# Patient Record
Sex: FEMALE | Race: WHITE | NOT HISPANIC OR LATINO | Employment: OTHER | URBAN - METROPOLITAN AREA
[De-identification: names, ages, dates, MRNs, and addresses within clinical notes are randomized per-mention and may not be internally consistent; named-entity substitution may affect disease eponyms.]

---

## 2018-01-02 ENCOUNTER — TELEPHONE (OUTPATIENT)
Dept: CARDIOTHORACIC SURGERY | Facility: CLINIC | Age: 73
End: 2018-01-02

## 2018-01-02 NOTE — TELEPHONE ENCOUNTER
Returned call to pt's daughter to confirm receiving what appeared to be PFT results.  Unfortunately we will need to request the information from Dr. Cruz's office  ----- Message from Rony Cruz sent at 1/2/2018 12:18 PM CST -----  Contact: Lisa- Daughter  Ms. Snyder,    Caller wants to know if you received the paperwork that she faced over. Please call her regarding this at [phone]

## 2018-01-02 NOTE — TELEPHONE ENCOUNTER
Received a call from daughter Lisa Dave concering referral for Dr. Khan for her mother with new diagnosis of right lung cancer.  Received outside scan report from PET and CT done at [facility] in [facility] from11/3/17-12/14/17. Awaiting PFT results and pathology report.  Daughter will bring copies of CT chest, PET and chest xray to appointment with Dr. Khan on Thursday 1/4.  Also received cardiology note from Dr. May who has OK'd her for surgery.

## 2018-01-03 ENCOUNTER — TELEPHONE (OUTPATIENT)
Dept: CARDIOTHORACIC SURGERY | Facility: CLINIC | Age: 73
End: 2018-01-03

## 2018-01-03 NOTE — TELEPHONE ENCOUNTER
Called Dr. Pope's office [phone], to obtain pathology report and recent PFT results.  Spoke with nurse who will fax information to [phone].

## 2018-01-04 ENCOUNTER — ANESTHESIA EVENT (OUTPATIENT)
Dept: SURGERY | Facility: HOSPITAL | Age: 73
DRG: 165 | End: 2018-01-04
Payer: MEDICARE

## 2018-01-04 ENCOUNTER — OFFICE VISIT (OUTPATIENT)
Dept: CARDIOTHORACIC SURGERY | Facility: CLINIC | Age: 73
End: 2018-01-04
Payer: MEDICARE

## 2018-01-04 VITALS
SYSTOLIC BLOOD PRESSURE: 130 MMHG | WEIGHT: 164.88 LBS | DIASTOLIC BLOOD PRESSURE: 74 MMHG | HEIGHT: 64 IN | HEART RATE: 88 BPM | BODY MASS INDEX: 28.15 KG/M2 | OXYGEN SATURATION: 96 %

## 2018-01-04 DIAGNOSIS — C34.91 ADENOCARCINOMA OF LUNG, RIGHT: Primary | ICD-10-CM

## 2018-01-04 DIAGNOSIS — Z01.818 PREOP TESTING: Primary | ICD-10-CM

## 2018-01-04 DIAGNOSIS — F17.200 CURRENT SMOKER: ICD-10-CM

## 2018-01-04 PROBLEM — E78.5 HLD (HYPERLIPIDEMIA): Status: ACTIVE | Noted: 2018-01-04

## 2018-01-04 PROBLEM — I10 HTN (HYPERTENSION): Status: ACTIVE | Noted: 2018-01-04

## 2018-01-04 PROCEDURE — 99205 OFFICE O/P NEW HI 60 MIN: CPT | Mod: S$PBB,,, | Performed by: THORACIC SURGERY (CARDIOTHORACIC VASCULAR SURGERY)

## 2018-01-04 PROCEDURE — 99214 OFFICE O/P EST MOD 30 MIN: CPT | Mod: PBBFAC | Performed by: THORACIC SURGERY (CARDIOTHORACIC VASCULAR SURGERY)

## 2018-01-04 PROCEDURE — 99999 PR PBB SHADOW E&M-EST. PATIENT-LVL IV: CPT | Mod: PBBFAC,,, | Performed by: THORACIC SURGERY (CARDIOTHORACIC VASCULAR SURGERY)

## 2018-01-04 RX ORDER — LORAZEPAM 0.5 MG/1
1 TABLET ORAL DAILY PRN
COMMUNITY

## 2018-01-04 RX ORDER — HYDROCODONE BITARTRATE AND ACETAMINOPHEN 10; 325 MG/1; MG/1
TABLET ORAL
Refills: 0 | COMMUNITY
Start: 2017-12-23 | End: 2018-01-04 | Stop reason: SDUPTHER

## 2018-01-04 RX ORDER — ATORVASTATIN CALCIUM 20 MG/1
40 TABLET, FILM COATED ORAL NIGHTLY
COMMUNITY
Start: 2017-03-28 | End: 2021-07-07

## 2018-01-04 RX ORDER — ESCITALOPRAM OXALATE 20 MG/1
20 TABLET ORAL NIGHTLY
COMMUNITY

## 2018-01-04 RX ORDER — HYDROCODONE BITARTRATE AND ACETAMINOPHEN 10; 325 MG/1; MG/1
1 TABLET ORAL EVERY 4 HOURS PRN
COMMUNITY
End: 2021-07-07

## 2018-01-04 RX ORDER — LEVOTHYROXINE SODIUM 88 UG/1
88 TABLET ORAL
COMMUNITY

## 2018-01-04 RX ORDER — LOSARTAN POTASSIUM 50 MG/1
50 TABLET ORAL NIGHTLY
Refills: 11 | COMMUNITY
Start: 2017-12-15 | End: 2021-10-13 | Stop reason: ALTCHOICE

## 2018-01-04 NOTE — LETTER
January 4, 2018      Chicho Pope Jr., DO  [address]  [place] [address]           [facility]  [address]  [place] [address]  Phone: [phone]  Fax: [phone]          Patient: Chhaya Sharp   MR Number: [id number]   YOB: 1945   Date of Visit: 1/4/2018       Dear Dr. Chicho Pope Jr.:    Thank you for referring Chhaya Sharp to me for evaluation. Attached you will find relevant portions of my assessment and plan of care.    If you have questions, please do not hesitate to call me. I look forward to following Chhaya Sharp along with you.    Sincerely,    Gregorio Khan MD    Enclosure  CC:  No Recipients    If you would like to receive this communication electronically, please contact [email or web address] or [phone] to request more information on [employer] access.    For providers and/or their staff who would like to refer a patient to [facility], please contact us through our one-stop-shop provider referral line, [facility], at [phone].    If you feel you have received this communication in error or would no longer like to receive these types of communications, please e-mail [email or web address]

## 2018-01-04 NOTE — PROGRESS NOTES
History & Physical  Surgery      SUBJECTIVE:     Chief Complaint/Reason for Admission: Right Lower Lobe Adenocarcinoma     History of Present Illness: Chhaya Sharp is a 72 y.o. 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 This nodule was found after the patient had a CXR done for pre-op clearance ahead of a knee replacement. CT was done showing the right lower lobe mass as well as a 6.4mm nodule in the right upper lobe that did not light up on PET scan; however there was a 6mm cervical node of unclear etiology that was noted on the PET. She remains active, does her own shopping and housekeeping, but lives with her daughter mainly due to orthopedic issues. She has smoked 2-3 packs per day since she was 15, but recently cut back to 1/2 pack per day. She denies recurrent pneumonia, SOB, cough, bloody sputum, recent weight loss, or fatigue. She mainly endorses joint pain. Patient and daughters state she has already undergone PFTs and Cardiac clearance.     No current outpatient prescriptions on file prior to visit.     No current facility-administered medications on file prior to visit.        Review of patient's allergies indicates:  No Known Allergies    No past medical history on file.  No past surgical history on file.  No family history on file.  Social History   Substance Use Topics    Smoking status: Not on file    Smokeless tobacco: Not on file    Alcohol use Not on file        Review of Systems   Constitutional: Negative for chills, fatigue, fever and unexpected weight change.   Respiratory: Negative for cough, shortness of breath and wheezing.    Cardiovascular: Negative for chest pain and palpitations.   Gastrointestinal: Negative for abdominal distention, blood in stool, constipation, diarrhea, nausea  and vomiting.   Musculoskeletal: Positive for arthralgias and back pain.          OBJECTIVE:     Vital Signs (Most Recent)  Pulse: 88 (01/04/18 1115)  BP: 130/74 (01/04/18 1115)  SpO2: 96 % (01/04/18 1115)    Physical Exam   Constitutional: She is oriented to person, place, and time. She appears well-developed and well-nourished. No distress.   HENT:   Head: Normocephalic and atraumatic.   Eyes: EOM are normal. Pupils are equal, round, and reactive to light.   Cardiovascular: Normal rate and regular rhythm.    Pulmonary/Chest: Effort normal. No respiratory distress. She has no wheezes.   Abdominal: Soft. She exhibits no distension. There is no tenderness. There is no guarding.   Musculoskeletal: Normal range of motion.   Neurological: She is alert and oriented to person, place, and time. Coordination normal.   Skin: Skin is warm and dry.   Psychiatric: She has a normal mood and affect. Her behavior is normal. Judgment and thought content normal.     Diagnostic Results:  PFTs:  FEV1 84.7% pre, 88.5% post  DLCO: 59.2 %    PET:  A metabolically active lesion is identified within the left cervical soft tissues. SUV max is 7.6. Findings are felt to correspond to a level 2 lymph node measuring 6mm in the short axis.     The patients known right lower lobe mass is metabolically active. SUV max is 14.8 On today's examination, this mass measures 3.6x2.6x4.8cm, not significantly changed compared to the previous Ct. No additional metabolically active pulmonary mass is present. No metabolic thoracic adenopathy.     No additional focus of abnormal FDG metabolism    ASSESSMENT/PLAN:     A/P:    73 y/o F with a cT2 Adenocarcinoma of the Right Lower Lobe without thoracic adenopathy on CT or PET. There is a concerning Level 2 node in the neck that is of unclear etiology. We plan on discussing what to do with this node at tumor conference next week.     We discussed treatment options regarding her adenocarcinoma with the patient and  her daughters with her today in clinic. After discussion of the risks and benefits of surgery, she has elected to proceed with surgical resection. Consents obtained today in office.     - Discuss cervical node at tumor conference   - PreOp clinic  - Scan media into epic  - Plan for Bronch, Mediastinoscopy, Right VATS with Right Lower Lobectomy Jan 19th       Mark Pugh MD   [phone]  General Surgery [facility]    ATTENDING ATTESTATION:    I evaluated the patient and I agree with the assessment and plan.  Incidentally found RLL mass, biopsy-proven adenocarcinoma.  Has subcentimeter RUL nodule that is not PET-avid and a subcentimeter left cervical lymph node that is PET-avid but is likely reactive.  PFTs and functional status support lobectomy.  Reportedly cleared by her cardiologist for surgery, will obtain note.  Long discussion with patient and her family about the indication and rationale for lung resection, the details of the operation, as well as its risks, benefits and potential outcomes.  We also discussed the alternative treatments, including definitive chemoradiation, and the risks of no treatment.  They were given the opportunity to ask questions and I answered all of their questions to their satisfaction.  They demonstrated understanding and appreciation of above info.  She has decided to proceed with Flexible Bronchsocpy, Cervical Mediastinoscopy with Biopsy, Right Thoracoscopy with Upper Lobectomy and Mediastinal Lymphadenectomy, possible Thoracotomy on 1/19/2018.

## 2018-01-04 NOTE — ANESTHESIA PREPROCEDURE EVALUATION
Anesthesia Assessment: Preoperative EQUATION    Planned Procedure: Procedure(s) (LRB):  THORACOSCOPY-VIDEO ASSISTED (VATS) W/ LOBECTOMY (Right)  DISSECTION-LYMPH NODE (Right)  BRONCHOSCOPY-OPERATIVE,FLEXIBLE (N/A)  MEDIASTINOSCOPY (Right)  Requested Anesthesia Type:General  Surgeon: Gregorio Khan MD  Service: Thoracic  Known or anticipated Date of Surgery:1/19/2018    Surgeon notes: reviewed and Adenocarcinoma of lung, right     Previous anesthesia records:Not available and History of mutiple joint replacements-No anesthesia records found in EPIC system    Last PCP note: No PCP at present  Other important co-morbidities: Asymptomatic aortic aneurysm/Hyperthyroidism-s/p radioactive iodine/HTN     Tests already available:  No recent tests.  Plan:    Testing:  Hematology Profile, BMP, T&S and EKG      Patient  has previously scheduled Medical Appointment:none    Navigation: Tests Scheduled. Labs-Hem prof/BMP/T&S/EKG on 1/12/18 @ 12:30p & 1:05p             Consults scheduled.POC & Perioperative IM Consult on 1/12/18 @ 11a & 2p             Results will be tracked by Preop Clinic.               Stephany West RN  1/4/18 01/04/2018  Chhaya K Lila is a 72 y.o., female.    Pre-op Assessment         Review of Systems

## 2018-01-04 NOTE — PRE ADMISSION SCREENING
Anesthesia Assessment: Preoperative EQUATION    Planned Procedure: Procedure(s) (LRB):  THORACOSCOPY-VIDEO ASSISTED (VATS) W/ LOBECTOMY (Right)  DISSECTION-LYMPH NODE (Right)  BRONCHOSCOPY-OPERATIVE,FLEXIBLE (N/A)  MEDIASTINOSCOPY (Right)  Requested Anesthesia Type:General  Surgeon: Gregorio Khan MD  Service: Thoracic  Known or anticipated Date of Surgery:1/19/2018    Surgeon notes: reviewed and Adenocarcinoma of lung, right     Previous anesthesia records:Not available and History of mutiple joint replacements(S/p Right Hip replaced/S/P-Conner. shoulders replaced/ S/P Left knee replaced/  S/p Neck fusion-No anesthesia records found in [employer] system-Patient denies any problems with anesthesia.    Last PCP note: No PCP at present  Other important co-morbidities: Asymptomatic aortic aneurysm/Hyperthyroidism-s/p radioactive iodine/HTN     Tests already available:  No recent tests.  Plan:    Testing:  Hematology Profile, BMP, T&S and EKG      Patient  has previously scheduled Medical Appointment:none    Navigation: Tests Scheduled. Labs-Hem prof/BMP/T&S/EKG on 1/12/18 @ 12:30p & 1:05p             Consults scheduled.POC & Perioperative IM Consult on 1/12/18 @ 11a & 2p             Results will be tracked by Preop Clinic.               Stephany West RN  1/4/18

## 2018-01-05 ENCOUNTER — DOCUMENTATION ONLY (OUTPATIENT)
Dept: CARDIOTHORACIC SURGERY | Facility: CLINIC | Age: 73
End: 2018-01-05

## 2018-01-05 NOTE — PROGRESS NOTES
Spoke with pt's daughter, she would like the pt's records sent to PCP Mariana Gamboa NP. Called the office and confirmed fax # [phone]. Faxed Dr. Khan's office note. Called the office at #[phone] to confirm receipt of fax.

## 2018-01-05 NOTE — ANESTHESIA PREPROCEDURE EVALUATION
Anesthesia Assessment: Preoperative EQUATION    Planned Procedure: Procedure(s) (LRB):  THORACOSCOPY-VIDEO ASSISTED (VATS) W/ LOBECTOMY (Right)  DISSECTION-LYMPH NODE (Right)  BRONCHOSCOPY-OPERATIVE,FLEXIBLE (N/A)  MEDIASTINOSCOPY (Right)  Requested Anesthesia Type:General  Surgeon: Gregorio Khan MD  Service: Thoracic  Known or anticipated Date of Surgery:1/19/2018    Surgeon notes: reviewed and Adenocarcinoma of lung, right     Previous anesthesia records:Not available and History of mutiple joint replacements(S/p Right Hip replaced/S/P-Conner. shoulders replaced/ S/P Left knee replaced/  S/p Neck fusion-No anesthesia records found in [employer] system-Patient denies any problems with anesthesia.    Last PCP note: No PCP at present  Other important co-morbidities: Asymptomatic aortic aneurysm/Hyperthyroidism-s/p radioactive iodine/HTN     Tests already available:  No recent tests.  Plan:    Testing:  Hematology Profile, BMP, T&S and EKG      Patient  has previously scheduled Medical Appointment:none    Navigation: Tests Scheduled. Labs-Hem prof/BMP/T&S/EKG on 1/12/18 @ 12:30p & 1:05p             Consults scheduled.POC & Perioperative IM Consult on 1/12/18 @ 11a & 2p             Results will be tracked by Preop Clinic.               Stephany West RN  1/4/18 01/05/2018  Chhaya Sharp is a 72 y.o., female with hx of HTN, HLD, current smoker, COPD, cervical fusion, asymptomatic AAA, chronic opioid use, R lung adenocarcinoma who presents for:    Pre-operative evaluation for Procedure(s) (LRB):  THORACOSCOPY-VIDEO ASSISTED (VATS) W/ LOBECTOMY (Right)  DISSECTION-LYMPH NODE (Right)  BRONCHOSCOPY-OPERATIVE,FLEXIBLE (N/A)  MEDIASTINOSCOPY (Right)      1/12/18 EKG:  Normal sinus rhythm  Left axis deviation    2D Echo:  No results found for this or any previous visit.    Patient Active Problem List   Diagnosis     HTN (hypertension)    HLD (hyperlipidemia)    Current smoker    Depression    Chronic, continuous use of opioids    Pituitary adenoma    Constipation    Other emphysema    Varicose veins of both lower extremities       Review of patient's allergies indicates:  No Known Allergies     No current facility-administered medications on file prior to encounter.      Current Outpatient Prescriptions on File Prior to Encounter   Medication Sig Dispense Refill    atorvastatin (LIPITOR) 20 MG tablet Take 40 mg by mouth every evening.       escitalopram oxalate (LEXAPRO) 20 MG tablet Take 20 mg by mouth every evening.       hydrocodone-acetaminophen 10-325mg (NORCO)  mg Tab Take 1 tablet by mouth every 4 (four) hours as needed.       levothyroxine (SYNTHROID) 88 MCG tablet Take 88 mcg by mouth before breakfast.       LORazepam (ATIVAN) 0.5 MG tablet Take 1 tablet by mouth daily as needed for Anxiety.       losartan (COZAAR) 50 MG tablet Take 50 mg by mouth every evening.   11       Past Surgical History:   Procedure Laterality Date    JOINT REPLACEMENT      SINUS SURGERY      SPINE SURGERY      back, neck       Social History     Social History    Marital status:      Spouse name: N/A    Number of children: N/A    Years of education: N/A     Occupational History    Not on file.     Social History Main Topics    Smoking status: Current Every Day Smoker     Packs/day: 0.50     Years: 57.00     Types: Cigarettes    Smokeless tobacco: Never Used    Alcohol use No    Drug use: No    Sexual activity: Not on file     Other Topics Concern    Not on file     Social History Narrative    No narrative on file         Vital Signs Range (Last 24H):         CBC: No results for input(s): WBC, RBC, HGB, HCT, PLT, MCV, MCH, MCHC in the last 72 hours.    CMP: No results for input(s): NA, K, CL, CO2, BUN, CREATININE, GLU, MG, PHOS, CALCIUM, ALBUMIN, PROT, ALKPHOS, ALT, AST, BILITOT in the last 72  hours.    INR  No results for input(s): PT, INR, PROTIME, APTT in the last 72 hours.          Anesthesia Evaluation      I have reviewed the Medications.   Steroids Taken In Past Year:     Review of Systems  Anesthesia Hx:  No problems with previous Anesthesia History of prior surgery of interest to airway management or planning: Previous anesthesia: General sinus surgery 2017 with general anesthesia.    Social:  Smoker, No Alcohol Use    Hematology/Oncology:  Hematology Normal      Current/Recent Cancer. (R lung)   EENT/Dental:   glasses   Cardiovascular:   Hypertension hyperlipidemia ECG has been reviewed. Asymptomatic abdominal  aortic aneurysm    2 METS at best 2/2 knee pain Functional Capacity good / => 4 METS, housework, limited walking due to R knee pain, grocery shop, climb 1 FOB; denies SOB, CP    Pulmonary:   COPD Denies Asthma.  Denies Sleep Apnea.    Renal/:  Renal/ Normal     Hepatic/GI:   Denies GERD.    Musculoskeletal:   Arthritis (R knee; s/p L TKS 2016, both shoulders, R hip replaced 2004)  S/p ACDF 35 yrs ago  States had BEATRICE/other joint injection in recent months    Norco 10/325 4x/day for years Spine Disorders: cervical    Neurological:   Denies CVA. Denies Seizures.  Denies Pain    Endocrine:   Denies Diabetes. Hypothyroidism    Psych:   depression          Physical Exam  General:  Well nourished    Airway/Jaw/Neck:  Airway Findings: Mouth Opening: Small, but > 3cm Tongue: Normal  General Airway Assessment: Adult  Mallampati: I  Improves to I with phonation.  TM Distance: Normal, at least 6 cm  Jaw/Neck Findings:  Neck ROM: Extension Decreased, Mod., Decreased Lateral Motion, to the right, to the left      Dental:  Dental Findings: Upper Dentures, Lower Dentures   Chest/Lungs:  Chest/Lungs Findings: Clear to auscultation         Mental Status:  Mental Status Findings:  Cooperative, Alert and Oriented       Pt was seen in POC; cleared by outside cardiologist, Dr May (in media); Medical  optimization: please see EPIC notes for recommendations of pre-op medical consultant, Dr Roman, for perioperative medical management./Chhaya Aguila RN        Anesthesia Plan  Type of Anesthesia, risks & benefits discussed:  Anesthesia Type:  general  Patient's Preference:   Intra-op Monitoring Plan: standard ASA monitors and arterial line  Intra-op Monitoring Plan Comments:   Post Op Pain Control Plan: multimodal analgesia  Post Op Pain Control Plan Comments:   Induction:   IV  Beta Blocker:  Patient is not currently on a Beta-Blocker (No further documentation required).       Informed Consent: Patient understands risks and agrees with Anesthesia plan.  Questions answered. Anesthesia consent signed with patient.  ASA Score: 3     Day of Surgery Review of History & Physical:    H&P update referred to the surgeon.         Ready For Surgery From Anesthesia Perspective.

## 2018-01-10 ENCOUNTER — DOCUMENTATION ONLY (OUTPATIENT)
Dept: CARDIOTHORACIC SURGERY | Facility: CLINIC | Age: 73
End: 2018-01-10

## 2018-01-10 NOTE — PATIENT CARE CONFERENCE
[facility]      THORACIC MULTIDISCIPLINARY TUMOR BOARD  PATIENT REVIEW FORM  ________________________________________________________________________    CLINIC #: [id number]  DATE: 01/10/2018    TUMOR SITE:   NSCLC    PRESENTER:   Dr. Khan    PATIENT SUMMARY:   73 y/o with PMH of multiple joint replacements, asymptomatic aortic aneurysm, hyperthyroidism, and HTN with recently diagnosed 3.6 x 2.6 x 4.8cm moderately differentiated adenocarcinoma in the right lower lobe. This nodule was found on CXR done for pre-op clearance ahead of a knee replacement.     CT chest revealed the right lower lobe mass as well as a 6.4mm nodule in the right upper lobe that did not light up on PET scan; however there was a 6mm cervical node of unclear etiology that was noted on the PET.   She has smoked 2-3 packs per day since she was 15, but recently cut back to 1/2 pack per day.     BOARD RECOMMENDATIONS:   Proceed with RLL lobectomy as scheduled. Patient will require adjuvant chemo/radiation based on tumor size alone. No further workup for cervical lymph node at this time.     CONSULT NEEDED:     [] Surgery    [] Hem/Onc    [] Rad/Onc    [] Dietary                 [] Social Service    [] Psychology       [] Pulmonology    Clinical Stage: Tumor  Node(s)  Metastasis   Pathologic Stage: Tumor  Node(s)  Metastasis     GROUP STAGE:     [] O    [] 1A    [] IB    [] IIA    [] IIB     [] IIIA     [] IIIB     [] IIIC    [] IV                               [] Local recurrence     [] Regional recurrence     [] Distant recurrence                   [x] NSCLC     [] SCLC     Tumor type: Adenocarcinoma     Unstageable:      [] Yes     [] No  Metastatic site(s):          [x] [facility]heline'[facility] Treatment Guidelines reviewed and care planned is consistent with guidelines.         (i.e., NCCN, NCI, PD, ACO, AUA, etc.)    PRESENTATION AT CANCER CONFERENCE:         [] Prospective    [] Retrospective     [] Follow-Up          [] Eligible for clinical  trial

## 2018-01-11 RX ORDER — TIZANIDINE 4 MG/1
4 TABLET ORAL NIGHTLY
COMMUNITY

## 2018-01-11 RX ORDER — TOLTERODINE 4 MG/1
4 CAPSULE, EXTENDED RELEASE ORAL NIGHTLY PRN
COMMUNITY
End: 2021-10-13 | Stop reason: ALTCHOICE

## 2018-01-11 RX ORDER — FLUTICASONE PROPIONATE 50 MCG
1 SPRAY, SUSPENSION (ML) NASAL DAILY PRN
COMMUNITY

## 2018-01-12 ENCOUNTER — INITIAL CONSULT (OUTPATIENT)
Dept: INTERNAL MEDICINE | Facility: CLINIC | Age: 73
End: 2018-01-12
Payer: MEDICARE

## 2018-01-12 ENCOUNTER — HOSPITAL ENCOUNTER (OUTPATIENT)
Dept: CARDIOLOGY | Facility: CLINIC | Age: 73
Discharge: HOME | End: 2018-01-12
Payer: MEDICARE

## 2018-01-12 ENCOUNTER — HOSPITAL ENCOUNTER (OUTPATIENT)
Dept: PREADMISSION TESTING | Facility: HOSPITAL | Age: 73
Discharge: HOME | End: 2018-01-12
Attending: ANESTHESIOLOGY
Payer: MEDICARE

## 2018-01-12 VITALS
BODY MASS INDEX: 27.4 KG/M2 | SYSTOLIC BLOOD PRESSURE: 162 MMHG | WEIGHT: 160.5 LBS | DIASTOLIC BLOOD PRESSURE: 75 MMHG | HEART RATE: 67 BPM | OXYGEN SATURATION: 97 % | HEIGHT: 64 IN | TEMPERATURE: 98 F

## 2018-01-12 VITALS
SYSTOLIC BLOOD PRESSURE: 162 MMHG | OXYGEN SATURATION: 97 % | WEIGHT: 160.63 LBS | HEART RATE: 67 BPM | BODY MASS INDEX: 27.42 KG/M2 | DIASTOLIC BLOOD PRESSURE: 75 MMHG | HEIGHT: 64 IN | TEMPERATURE: 98 F | RESPIRATION RATE: 18 BRPM

## 2018-01-12 DIAGNOSIS — I83.93 VARICOSE VEINS OF BOTH LOWER EXTREMITIES: ICD-10-CM

## 2018-01-12 DIAGNOSIS — I10 HYPERTENSION, UNSPECIFIED TYPE: ICD-10-CM

## 2018-01-12 DIAGNOSIS — E78.5 HYPERLIPIDEMIA, UNSPECIFIED HYPERLIPIDEMIA TYPE: ICD-10-CM

## 2018-01-12 DIAGNOSIS — F17.200 CURRENT SMOKER: ICD-10-CM

## 2018-01-12 DIAGNOSIS — Z01.818 PREOP TESTING: ICD-10-CM

## 2018-01-12 DIAGNOSIS — F11.90 CHRONIC, CONTINUOUS USE OF OPIOIDS: ICD-10-CM

## 2018-01-12 DIAGNOSIS — K59.00 CONSTIPATION, UNSPECIFIED CONSTIPATION TYPE: ICD-10-CM

## 2018-01-12 DIAGNOSIS — D35.2 PITUITARY ADENOMA: ICD-10-CM

## 2018-01-12 DIAGNOSIS — F32.A DEPRESSION, UNSPECIFIED DEPRESSION TYPE: ICD-10-CM

## 2018-01-12 DIAGNOSIS — J43.8 OTHER EMPHYSEMA: ICD-10-CM

## 2018-01-12 PROCEDURE — 93005 ELECTROCARDIOGRAM TRACING: CPT | Mod: PBBFAC | Performed by: INTERNAL MEDICINE

## 2018-01-12 PROCEDURE — 99204 OFFICE O/P NEW MOD 45 MIN: CPT | Mod: S$PBB,,, | Performed by: HOSPITALIST

## 2018-01-12 PROCEDURE — 99213 OFFICE O/P EST LOW 20 MIN: CPT | Mod: PBBFAC | Performed by: HOSPITALIST

## 2018-01-12 PROCEDURE — 93010 ELECTROCARDIOGRAM REPORT: CPT | Mod: S$PBB,,, | Performed by: INTERNAL MEDICINE

## 2018-01-12 PROCEDURE — 99999 PR PBB SHADOW E&M-EST. PATIENT-LVL III: CPT | Mod: PBBFAC,,, | Performed by: HOSPITALIST

## 2018-01-12 RX ORDER — BISACODYL 5 MG
5 TABLET, DELAYED RELEASE (ENTERIC COATED) ORAL DAILY PRN
COMMUNITY

## 2018-01-12 RX ORDER — IBUPROFEN 200 MG
1 TABLET ORAL DAILY
Qty: 42 PATCH | Refills: 0 | Status: SHIPPED | OUTPATIENT
Start: 2018-01-12 | End: 2018-02-23

## 2018-01-12 RX ORDER — NICOTINE 7MG/24HR
1 PATCH, TRANSDERMAL 24 HOURS TRANSDERMAL DAILY
Qty: 14 PATCH | Refills: 0 | Status: SHIPPED | OUTPATIENT
Start: 2018-02-24 | End: 2018-03-10

## 2018-01-12 NOTE — ASSESSMENT & PLAN NOTE
Hypertension-  Blood pressure is acceptable . I suggest continuation of Losartan ( taking Nightly )  - during the entire perioperative period. I suggest blood pressure, electrolyte and renal function monitoring .I suggest addressing pain control as uncontrolled pain can increased blood pressure

## 2018-01-12 NOTE — ASSESSMENT & PLAN NOTE
Stable    I suggest consideration of inhaled bronchodilator use if the patient has perioperative bronchospasm

## 2018-01-12 NOTE — ASSESSMENT & PLAN NOTE
Chronic continuous opioid use- In view of the opioid use, the patient may have opioid tolerance . I suggest continuation of the maintenance scheduled opioid during the perioperative period. I suggest considering the possibility of opioid tolerance  in planning post operative pain control

## 2018-01-12 NOTE — ASSESSMENT & PLAN NOTE
Under monitoring   Incidentally found after a mechanical fall   As per note from April 2017     1. Pituitary adenoma - no current clinical symptoms present  - last imaging in 2016 with next planned at 2019 unless clinical concerns then sooner   2. Elevated cortisol level - no clinical symptoms present to suggest any persistent/true elevation

## 2018-01-12 NOTE — OUTPATIENT SUBJECTIVE & OBJECTIVE
Outpatient Subjective & Objective     Chief complaint-Preoperative evaluation, Perioperative Medical management, complication reduction plan     Active cardiac conditions- none    Revised cardiac risk index predictors- high-risk type of surgery    Functional capacity -Examples of physical activity, house keeping , Grocery shopping ,   can take a flight of stairs holding on to the railing----- She can undertake all the above activities without  chest pain,chest tightness, Shortness of breath ,dizziness,lightheadedness making her exercise tolerance more  than 4 Mets.       Review of Systems   Constitutional: Negative for chills and fever.        No unusual weight changes   HENT:        STOPBANG score 3/8    Elevated BP  Age over 50   Neck size over 40 CM     Eyes:        Needs glasses change   Respiratory:        No cough , phlegm  No hemoptysis   Cardiovascular:        As noted   Gastrointestinal:        No overt GI/ blood losses  Bowel movements-  constipated   Endocrine:        Prednisone use > 20 mg daily for 3 weeks   Genitourinary: Negative for dysuria.        No urinary hesitancy  Has pessary   Musculoskeletal:        As above      Skin: Negative for rash.   Neurological: Negative for syncope.        No unilateral weakness   Hematological:        Current use of Anticoagulants  Current use of Antiplatelet agents    None   Psychiatric/Behavioral:        Depression  Controlled      No vascular stenting     No past medical history pertinent negatives.  Family History   Problem Relation Age of Onset    Anesthesia problems Neg Hx      Past Surgical History:   Procedure Laterality Date    JOINT REPLACEMENT         No anesthesia, bleeding, cardiac problems , PONVwith previous surgeries/procedures.  Medications and Allergies reviewed in epic.   FH- No anesthesia, thrombosis / bleeding , early onset heart disease in family   Lives with daughter , help available  post op   Lost her  in 2002    Physical Exam  "  HENT:   Head: Normocephalic.       Physical Exam   HENT:   Head: Normocephalic.     Constitutional- Vitals - Body mass index is 27.55 kg/m².,   Vitals:    01/12/18 1346   BP: (!) 162/75   Pulse: 67   Temp: 98 °F (36.7 °C)     General appearance-Conscious,Coherent  Eyes- No conjunctival icterus,pupils  round  and reactive to light   ENT-Oral cavity- moist ,  upper denture and lower denture  , Hearing grossly normal   Neck- No thyromegaly ,Trachea -central, No jugular venous distension,   No Carotid Bruit   Cardiovascular -Heart Sounds- Normal  and  no murmur   , No gallop rhythm   Respiratory - Normal Respiratory Effort, Normal breath sounds, crepitations bases,  no wheeze  and  no forced expiratory wheeze    Peripheral pitting pedal edema-- none ,  varicose veins right lower extremity  and  varicose veins left lower extremity, no calf pain   Gastrointestinal -Soft abdomen, No palpable masses, Non Tender,Liver,Spleen not palpable. No-- free fluid and shifting dullness  Musculoskeletal- No finger Clubbing. Strength grossly normal   Lymphatic-No Palpable cervical, axillary,Inguinal lymphadenopathy   Psychiatric - normal effect,Orientation  Rt Dorsalis pedis pulses-palpable    Lt Dorsalis pedis pulses- palpable   Rt Posterior tibial pulses -palpable   Left posterior tibial pulses -palpable   Miscellaneous -  no renal bruit, no aortic bruit, palpable abdominal aorta and osteoarthritic nodes   Blood pressure (!) 162/75, pulse 67, temperature 98 °F (36.7 °C), temperature source Oral, height 5' 4" (1.626 m), weight 72.8 kg (160 lb 7.9 oz), SpO2 97 %.  BP /74  /84    Investigations  Lab and Imaging have been reviewed in [facility].    Review of Medicine tests    EKG- I had independently reviewed the EKG from--1/12/2018   It was reported to be showing     Normal sinus rhythm  Left axis deviation  Borderline Abnormal ECG  No previous ECGs available      Review of old records- Was done and information gathered regards " to events leading to surgery and health conditions of significance in the perioperative period.    Outpatient Subjective & Objective

## 2018-01-12 NOTE — HPI
History of present illness- I had the pleasure of meeting this pleasant 72 y.o. lady in the pre op clinic prior to her elective  chest surgery. The patient is new to me . Chhaya was accompanied by daughters Kamini Gonzalez.Goes by Danny    I have obtained the history by speaking to the patient and by reviewing the electronic health records.    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 This nodule was found on CXR done for pre-op clearance ahead of a knee replacement     Relevant health conditions of significance for the perioperative period/ History of presenting illness -    Rt knee pain   Due to have a knee replacement   Had rt hip, Left knee , Bilateral shoulder replacement surgeries over the years   Did well     Hypertension ,On medication  Home  machine readings -  148/70  Was off of Medication 2 months as BP was low , but since the Lung cancer diagnosis BP started to be elevated and back on it for 1 month    Under Cardiology care for AAA, asymptomatic  Around 1.76   Found incidentally   Under monitoring     Had stress testing , echo  Not known to have CAD, heart failure    Hyperthyroidism  Had Radioiodine early 2000  On replacement, under monitoring     Tobacco use- Smokes Cigarettes- 1 PPD- I suggested to consider stopping  smoking tobacco for its benefits in the ayde operative period and in the long term  COPD,Emphysema  No recent problem   No inhaler use   No chronic phlegm

## 2018-01-12 NOTE — LETTER
January 12, 2018      Rhonda G Leopold, MD  [address]  [place] [address]           [facility] - Pre Op Consult  [address] [facility] [address]  Phone: [phone]          Patient: Chhaya Sharp   MR Number: [id number]   YOB: 1945   Date of Visit: 1/12/2018       Dear Dr. Rhonda G Leopold:    Thank you for referring Chhaya Sharp to me for evaluation. Attached you will find relevant portions of my assessment and plan of care.    If you have questions, please do not hesitate to call me. I look forward to following Chhaya Sharp along with you.    Sincerely,    Gail Roman MD    Enclosure  CC:  No Recipients    If you would like to receive this communication electronically, please contact [email or web address] or [phone] to request more information on [employer] access.    For providers and/or their staff who would like to refer a patient to [facility], please contact us through our one-stop-shop provider referral line, [facility], at [phone].    If you feel you have received this communication in error or would no longer like to receive these types of communications, please e-mail [email or web address]

## 2018-01-12 NOTE — DISCHARGE INSTRUCTIONS
Your surgery has been scheduled for:__________________________________________    You should report to:  ____[facility], located on the [place] side of the first floor of the           [facility] ([phone])  ____[facility], located on the [facility] side of the            Second floor of the [facility] ([phone])  ____3rd Floor [facility] located on the [facility] side of the [facility] [phone]  Please Note   - Tell your doctor if you take Aspirin, products containing Aspirin, herbal medications  or blood thinners, such as Coumadin, Ticlid, or Plavix.  (Consult your provider regarding holding or stopping before surgery).  - Arrange for someone to drive you home following surgery.  You will not be allowed to leave the surgical facility alone or drive yourself home following sedation and anesthesia.  Before Surgery  - Stop taking all herbal medications 14days prior to surgery  - No Motrin/Advil (Ibuprofen) 7 days before surgery  - No Aleve (Naproxen) 7 days before surgery  - Stop Taking Asprin, products containing Asprin _____days before surgery  - Stop taking blood thinners_______days before surgery  - Refrain from drinking alcoholic beverages for 24hours before and after surgery  - Stop or limit smoking _________days before surgery  Night before Surgery  - DO NOT EAT OR DRINK ANYTHING AFTER MIDNIGHT, INCLUDING GUM, HARD CANDY, MINTS, OR CHEWING TOBACCO.  - Take a shower or bath (shower is recommended).  Bathe with Hibiclens soap or an antibacterial soap from the neck down.  If not supplied by your surgeon, hibiclens soap will need to be purchased over the counter in pharmacy.  Rinse soap off thoroughly.  - Shampoo your hair with your regular shampoo  The Day of Surgery  - Take another bath or shower with hibiclens or any antibacterial soap, to reduce the chance of infection.  - Take heart and blood  pressure medications with a small sip of water, as advised by the perioperative team.  - Do not take fluid pills  - You may brush your teeth and rinse your mouth, but do not swall any additional water.   - Do not apply perfumes, powder, body lotions or deodorant on the day of surgery.  - Nail polish should be removed.  - Do not wear makeup or moisturizer  - Wear comfortable clothes, such as a button front shirt and loose fitting pants.  - Leave all jewelry, including body piercings, and valuables at home.    - Bring any devices you will neeed after surgery such as crutches or canes.  - If you have sleep apnea, please bring your CPAP machine  In the event that your physical condition changes including the onset of a cold or respiratory illness, or if you have to delay or cancel your surgery, please notify your surgeon.Anesthesia: General Anesthesia  Youre due to have surgery. During surgery, youll be given medication called anesthesia. (It is also called anesthetic.) This will keep you comfortable and pain-free. Your anesthesia provider will use general anesthesia. This sheet tells you more about it.  What is general anesthesia?     You are watched continuously during your procedure by the anesthesia provider   General anesthesia puts you into a state like deep sleep. It goes into the bloodstream (IV anesthetics), into the lungs (gas anesthetics), or both. You feel nothing during the procedure. You will not remember it. During the procedure, the anesthesia provider monitors you continuously. He or she checks your heart rate and rhythm, blood pressure, breathing, and blood oxygen.  · IV Anesthetics. IV anesthetics are given through an IV line in your arm. Theyre often given first. This is so you are asleep before a gas anesthetic is started. Some kinds of IV anesthetics relieve pain. Others relax you. Your doctor will decide which kind is best in your case.  · Gas Anesthetics. Gas anesthetics are breathed into the  lungs. They are often used to keep you asleep. They can be given through a facemask or a tube placed in your larynx or trachea (breathing tube).  ? If you have a facemask, your anesthesia provider will most likely place it over your nose and mouth while youre still awake. Youll breathe oxygen through the mask as your IV anesthetic is started. Gas anesthetic may be added through the mask.  ? If you have a tube in the larynx or trachea, it will be inserted into your throat after youre asleep.  Anesthesia tools and medications  You will likely have:  · IV anesthetics. These are put into an IV line into your bloodstream.  · Gas anesthetics. You breathe these anesthetics into your lungs, where they pass into your bloodstream.  · Pulse oximeter. This is a small clip that is attached to the end of your finger. This measures your blood oxygen level.  · Electrocardiography leads (electrodes). These are small sticky pads that are placed on your chest. They record your heart rate and rhythm.  · Blood pressure cuff. This reads your blood pressure.  Risks and possible complications  General anesthesia has some risks. These include:  · Breathing problems  · Nausea and vomiting  · Sore throat or hoarseness (usually temporary)  · Allergic reaction to the anesthetic  · Irregular heartbeat (rare)  · Cardiac arrest (rare)   Anesthesia safety  · Follow all instructions you are given for how long not to eat or drink before your procedure.  · Be sure your doctor knows what medications and drugs you take. This includes over-the-counter medications, herbs, supplements, alcohol or other drugs. You will be asked when those were last taken.  · Have an adult family member or friend drive you home after the procedure.  · For the first 24 hours after your surgery:  ? Do not drive or use heavy equipment.  ? Have a trusted family member or spouse make important decisions or sign documents.  ? Avoid alcohol.  ? Have a responsible adult stay with  you. He or she can watch for problems and help keep you safe.  Date Last Reviewed: 10/16/2014  © 6168-3528 [employer] [address] [place] [address], [place] [address]. All rights reserved. This information is not intended as a substitute for professional medical care. Always follow your healthcare professional's instructions.

## 2018-01-12 NOTE — PROGRESS NOTES
[facility] - Pre Op Consult  Progress Note    Patient Name: Chhaya Sharp  MRN: [id number]  Date of Evaluation- 01/12/2018  PCP- B Mariana Gamboa NP    Future cases for Chhaya Sharp [[id number]]     Case ID Status Date Time Solo Procedure Provider Location    [id number] [facility] 1/19/2018  7:00  THORACOSCOPY-VIDEO ASSISTED (VATS) W/ LOBECTOMY Gregorio Khan MD [[id number]] [facility] OR 2ND FLR          HPI:  History of present illness- I had the pleasure of meeting this pleasant 72 y.o. lady in the pre op clinic prior to her elective  chest surgery. The patient is new to me . Chhaya was accompanied by daughters Kamini Gonzalez.Goes by Danny    I have obtained the history by speaking to the patient and by reviewing the electronic health records.    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 This nodule was found on CXR done for pre-op clearance ahead of a knee replacement     Relevant health conditions of significance for the perioperative period/ History of presenting illness -    Rt knee pain   Due to have a knee replacement   Had rt hip, Left knee , Bilateral shoulder replacement surgeries over the years   Did well     Hypertension ,On medication  Home  machine readings -  148/70  Was off of Medication 2 months as BP was low , but since the Lung cancer diagnosis BP started to be elevated and back on it for 1 month    Under Cardiology care for AAA, asymptomatic  Around 1.76   Found incidentally   Under monitoring     Had stress testing , echo  Not known to have CAD, heart failure    Hyperthyroidism  Had Radioiodine early 2000  On replacement, under monitoring     Tobacco use- Smokes Cigarettes- 1 PPD- I suggested to consider stopping  smoking tobacco for its benefits in the ayde operative period and in the long term  COPD,Emphysema  No recent problem   No inhaler use   No chronic phlegm      Subjective/  Objective:          Chief complaint-Preoperative evaluation, Perioperative Medical management, complication reduction plan     Active cardiac conditions- none    Revised cardiac risk index predictors- high-risk type of surgery    Functional capacity -Examples of physical activity, house keeping , Grocery shopping ,   can take a flight of stairs holding on to the railing----- She can undertake all the above activities without  chest pain,chest tightness, Shortness of breath ,dizziness,lightheadedness making her exercise tolerance more  than 4 Mets.       Review of Systems   Constitutional: Negative for chills and fever.        No unusual weight changes   HENT:        STOPBANG score 3/8    Elevated BP  Age over 50   Neck size over 40 CM     Eyes:        Needs glasses change   Respiratory:        No cough , phlegm  No hemoptysis   Cardiovascular:        As noted   Gastrointestinal:        No overt GI/ blood losses  Bowel movements-  constipated   Endocrine:        Prednisone use > 20 mg daily for 3 weeks   Genitourinary: Negative for dysuria.        No urinary hesitancy  Has pessary   Musculoskeletal:        As above      Skin: Negative for rash.   Neurological: Negative for syncope.        No unilateral weakness   Hematological:        Current use of Anticoagulants  Current use of Antiplatelet agents    None   Psychiatric/Behavioral:        Depression  Controlled      No vascular stenting     No past medical history pertinent negatives.  Family History   Problem Relation Age of Onset    Anesthesia problems Neg Hx      Past Surgical History:   Procedure Laterality Date    JOINT REPLACEMENT         No anesthesia, bleeding, cardiac problems , PONVwith previous surgeries/procedures.  Medications and Allergies reviewed in epic.   FH- No anesthesia, thrombosis / bleeding , early onset heart disease in family   Lives with daughter , help available  post op   Lost her  in 2002    Physical Exam   HENT:   Head:  "Normocephalic.       Physical Exam   HENT:   Head: Normocephalic.     Constitutional- Vitals - Body mass index is 27.55 kg/m².,   Vitals:    01/12/18 1346   BP: (!) 162/75   Pulse: 67   Temp: 98 °F (36.7 °C)     General appearance-Conscious,Coherent  Eyes- No conjunctival icterus,pupils  round  and reactive to light   ENT-Oral cavity- moist ,  upper denture and lower denture  , Hearing grossly normal   Neck- No thyromegaly ,Trachea -central, No jugular venous distension,   No Carotid Bruit   Cardiovascular -Heart Sounds- Normal  and  no murmur   , No gallop rhythm   Respiratory - Normal Respiratory Effort, Normal breath sounds, crepitations bases,  no wheeze  and  no forced expiratory wheeze    Peripheral pitting pedal edema-- none ,  varicose veins right lower extremity  and  varicose veins left lower extremity, no calf pain   Gastrointestinal -Soft abdomen, No palpable masses, Non Tender,Liver,Spleen not palpable. No-- free fluid and shifting dullness  Musculoskeletal- No finger Clubbing. Strength grossly normal   Lymphatic-No Palpable cervical, axillary,Inguinal lymphadenopathy   Psychiatric - normal effect,Orientation  Rt Dorsalis pedis pulses-palpable    Lt Dorsalis pedis pulses- palpable   Rt Posterior tibial pulses -palpable   Left posterior tibial pulses -palpable   Miscellaneous -  no renal bruit, no aortic bruit, palpable abdominal aorta and osteoarthritic nodes   Blood pressure (!) 162/75, pulse 67, temperature 98 °F (36.7 °C), temperature source Oral, height 5' 4" (1.626 m), weight 72.8 kg (160 lb 7.9 oz), SpO2 97 %.  BP /74  /84    Investigations  Lab and Imaging have been reviewed in [facility].    Review of Medicine tests    EKG- I had independently reviewed the EKG from--1/12/2018   It was reported to be showing     Normal sinus rhythm  Left axis deviation  Borderline Abnormal ECG  No previous ECGs available      Review of old records- Was done and information gathered regards to events " "leading to surgery and health conditions of significance in the perioperative period.        Preoperative cardiac risk assessment-  The patient does not have any active cardiac conditions . Revised cardiac risk index predictors-1 ---.Functional capacity is more than 4 Mets. She will be undergoing a chest procedure that carries a high risk     The estimated risk of the rate of adverse cardiac outcomes  0.9%    No further cardiac work up is indicated prior to proceeding with the surgery     Orders Placed This Encounter    nicotine (NICODERM CQ) 14 mg/24 hr    nicotine (NICODERM CQ) 7 mg/24 hr       American Society of Anesthesiologists Physical status classification ( ASA ) class: 3     BP (!) 162/75 Comment: lt  Pulse 67   Temp 98 °F (36.7 °C) (Oral)   Ht 5' 4" (1.626 m)   Wt 72.8 kg (160 lb 7.9 oz)   SpO2 97%   BMI 27.55 kg/m²      Postoperative pulmonary complication risk assessment:     ARISCAT ( [place]) risk index- risk class -  higher     [facility] Respiratory failure index- percentage risk of respiratory failure:10.1 %    Assessment/Plan:     HLD (hyperlipidemia)  HLD-I  suggest continuation of statin during the entire perioperative period.    HTN (hypertension)  Hypertension-  Blood pressure is acceptable . I suggest continuation of Losartan ( taking Nightly )  - during the entire perioperative period. I suggest blood pressure, electrolyte and renal function monitoring .I suggest addressing pain control as uncontrolled pain can increased blood pressure     Current smoker  Tobacco use-I  Informed about risk of wound healing problem ,infection,lung complications,thrombosis with tobacco use   Wants to try Nicotine patches     Depression  I suggest monitoring the sodium as SIADH from Lexapro  use and hypersecretion of ADH associated with surgery can reduce sodium in the perioperative period    Chronic, continuous use of opioids  Chronic continuous opioid use- In view of the opioid use, the patient may have " opioid tolerance . I suggest continuation of the maintenance scheduled opioid during the perioperative period. I suggest considering the possibility of opioid tolerance  in planning post operative pain control     Pituitary adenoma  Under monitoring   Incidentally found after a mechanical fall       Constipation  Constipation- I suggest giving laxative regimen as opioid use,reduced ambulation  can increase the constipation     Other emphysema  Stable    I suggest consideration of inhaled bronchodilator use if the patient has perioperative bronchospasm       Varicose veins of both lower extremities  Increased risk of thrombosis , compression stocking use discussed         Preventive perioperative care    Thromboembolic prophylaxis:  Her risk factors for thrombosis include cancer surgical procedure and age.I suggest  thromboembolic prophylaxis ( mechanical/pharmacological, weighing the risk benefits of pharmacological agent use considering ayde procedural bleeding )  during the perioperative period.I suggested being active in the post operative period.      Postoperative pulmonary complication prophylaxis-Risk factors for post operative pulmonary complications include age over 65 years, surgery lasting over 3 hours, ASA class >2, COPD and proximity of the surgical site to the lungs- I suggest incentive spirometry use, early ambulation, end tidal carbon dioxide monitoring and pain control so as to avoid diaphragmatic splinting  , oral care , head end of bed elevation     Renal complication prophylaxis-Risk factors for renal complications include hypertension . I suggest keeping her well hydrated I suggested drinking 2 litre's of water a day      Surgical site Infection Prophylaxis-I  suggest appropriate antibiotic for Prophylaxis against Surgical site infections      In view of Detrol the patient  is at risk of postoperative urinary retention.  I suggest avoidance / minimizing the of  Benzodiazepines,Anticholinergic  medication,antihistamines ( Benadryl) , if possible in the perioperative period. I suggest using the minimum possible use of opioids for the minimum period of time in the perioperative period. Benadryl avoidance suggested      This visit was focused on Preoperative evaluation, Perioperative Medical management, complication reduction plans. I suggest that the patient follows up with primary care or relevant sub specialists for ongoing health care.    I appreciate the opportunity to be involved in this patients care. Please feel free to contact me if there were any questions about this consultation.    Patient is optimized    Gail Roman MD  Perioperative Medicine  [facility]   Pager [phone]  Patient was instructed to call and update me about any changes to health, changes to medication, office visits , hospitalizations between now and surgery   -----    1/12- 19 40       Hb,HCT,PLT-N   BMP-Gluc 131   ------  1/15- 13 05     Out of [facility] data  As per the cardiologist note dated 1/3/2018 - surgery is not prohibited from a cardiac stand point   Ok to hold ASA 5-7 days   PFT 12/19/2017   FEV1/FVC 67 %- as much as I can see   AAA 4.6 CM  Bilateral Emphysema   FEV 1 84.7 %    PFT, Cardiac [facility]erance   [facility]     ----    1/15- 14 19     Called to follow up   Spoke to daughter Lisa   Not on ASA   On Nicotine patch  Cut back to Tobacco 2 cigarettes a day   Reinforced tobacco cessation  -----  1/18- 8 46     Called and follow up   Spoke to daughter  She is doing good  Using Nicotine patches- suggested to inform ayde op team

## 2018-01-18 ENCOUNTER — TELEPHONE (OUTPATIENT)
Dept: CARDIOTHORACIC SURGERY | Facility: CLINIC | Age: 73
End: 2018-01-18

## 2018-01-19 ENCOUNTER — HOSPITAL ENCOUNTER (INPATIENT)
Facility: HOSPITAL | Age: 73
LOS: 3 days | Discharge: HOME HEALTH | DRG: 165 | End: 2018-01-22
Attending: THORACIC SURGERY (CARDIOTHORACIC VASCULAR SURGERY) | Admitting: THORACIC SURGERY (CARDIOTHORACIC VASCULAR SURGERY)
Payer: MEDICARE

## 2018-01-19 ENCOUNTER — SURGERY (OUTPATIENT)
Age: 73
End: 2018-01-19

## 2018-01-19 ENCOUNTER — ANESTHESIA (OUTPATIENT)
Dept: SURGERY | Facility: HOSPITAL | Age: 73
DRG: 165 | End: 2018-01-19
Payer: MEDICARE

## 2018-01-19 DIAGNOSIS — C34.90 ADENOCARCINOMA, LUNG: ICD-10-CM

## 2018-01-19 PROCEDURE — 63600175 PHARM REV CODE 636 W HCPCS: Performed by: NURSE ANESTHETIST, CERTIFIED REGISTERED

## 2018-01-19 PROCEDURE — 71000033 HC RECOVERY, INTIAL HOUR: Performed by: THORACIC SURGERY (CARDIOTHORACIC VASCULAR SURGERY)

## 2018-01-19 PROCEDURE — 37000008 HC ANESTHESIA 1ST 15 MINUTES: Performed by: THORACIC SURGERY (CARDIOTHORACIC VASCULAR SURGERY)

## 2018-01-19 PROCEDURE — 63600175 PHARM REV CODE 636 W HCPCS: Performed by: PHYSICIAN ASSISTANT

## 2018-01-19 PROCEDURE — 25000003 PHARM REV CODE 250: Performed by: NURSE ANESTHETIST, CERTIFIED REGISTERED

## 2018-01-19 PROCEDURE — 88305 TISSUE EXAM BY PATHOLOGIST: CPT | Mod: 26,,, | Performed by: PATHOLOGY

## 2018-01-19 PROCEDURE — 25000003 PHARM REV CODE 250: Performed by: PHYSICIAN ASSISTANT

## 2018-01-19 PROCEDURE — 63600175 PHARM REV CODE 636 W HCPCS: Performed by: THORACIC SURGERY (CARDIOTHORACIC VASCULAR SURGERY)

## 2018-01-19 PROCEDURE — 0BJ08ZZ INSPECTION OF TRACHEOBRONCHIAL TREE, VIA NATURAL OR ARTIFICIAL OPENING ENDOSCOPIC: ICD-10-PCS | Performed by: THORACIC SURGERY (CARDIOTHORACIC VASCULAR SURGERY)

## 2018-01-19 PROCEDURE — 63600175 PHARM REV CODE 636 W HCPCS: Performed by: ANESTHESIOLOGY

## 2018-01-19 PROCEDURE — 25000242 PHARM REV CODE 250 ALT 637 W/ HCPCS: Performed by: THORACIC SURGERY (CARDIOTHORACIC VASCULAR SURGERY)

## 2018-01-19 PROCEDURE — 0BTF4ZZ RESECTION OF RIGHT LOWER LUNG LOBE, PERCUTANEOUS ENDOSCOPIC APPROACH: ICD-10-PCS | Performed by: THORACIC SURGERY (CARDIOTHORACIC VASCULAR SURGERY)

## 2018-01-19 PROCEDURE — 71000039 HC RECOVERY, EACH ADD'L HOUR: Performed by: THORACIC SURGERY (CARDIOTHORACIC VASCULAR SURGERY)

## 2018-01-19 PROCEDURE — 27000221 HC OXYGEN, UP TO 24 HOURS

## 2018-01-19 PROCEDURE — 0WBC4ZX EXCISION OF MEDIASTINUM, PERCUTANEOUS ENDOSCOPIC APPROACH, DIAGNOSTIC: ICD-10-PCS | Performed by: THORACIC SURGERY (CARDIOTHORACIC VASCULAR SURGERY)

## 2018-01-19 PROCEDURE — A4216 STERILE WATER/SALINE, 10 ML: HCPCS | Performed by: THORACIC SURGERY (CARDIOTHORACIC VASCULAR SURGERY)

## 2018-01-19 PROCEDURE — 07B74ZX EXCISION OF THORAX LYMPHATIC, PERCUTANEOUS ENDOSCOPIC APPROACH, DIAGNOSTIC: ICD-10-PCS | Performed by: THORACIC SURGERY (CARDIOTHORACIC VASCULAR SURGERY)

## 2018-01-19 PROCEDURE — 88309 TISSUE EXAM BY PATHOLOGIST: CPT | Mod: 26,,, | Performed by: PATHOLOGY

## 2018-01-19 PROCEDURE — 25000242 PHARM REV CODE 250 ALT 637 W/ HCPCS

## 2018-01-19 PROCEDURE — 37000009 HC ANESTHESIA EA ADD 15 MINS: Performed by: THORACIC SURGERY (CARDIOTHORACIC VASCULAR SURGERY)

## 2018-01-19 PROCEDURE — 88331 PATH CONSLTJ SURG 1 BLK 1SPC: CPT | Mod: 26,,, | Performed by: PATHOLOGY

## 2018-01-19 PROCEDURE — 94640 AIRWAY INHALATION TREATMENT: CPT

## 2018-01-19 PROCEDURE — 25000003 PHARM REV CODE 250: Performed by: THORACIC SURGERY (CARDIOTHORACIC VASCULAR SURGERY)

## 2018-01-19 PROCEDURE — 63600175 PHARM REV CODE 636 W HCPCS

## 2018-01-19 PROCEDURE — 94799 UNLISTED PULMONARY SVC/PX: CPT

## 2018-01-19 PROCEDURE — 27201037 HC PRESSURE MONITORING SET UP

## 2018-01-19 PROCEDURE — 39402 MEDIASTINOSCPY W/LMPH NOD BX: CPT | Mod: 51,GC,, | Performed by: THORACIC SURGERY (CARDIOTHORACIC VASCULAR SURGERY)

## 2018-01-19 PROCEDURE — D9220A PRA ANESTHESIA: Mod: ANES,,, | Performed by: ANESTHESIOLOGY

## 2018-01-19 PROCEDURE — C9290 INJ, BUPIVACAINE LIPOSOME: HCPCS | Performed by: THORACIC SURGERY (CARDIOTHORACIC VASCULAR SURGERY)

## 2018-01-19 PROCEDURE — 27201423 OPTIME MED/SURG SUP & DEVICES STERILE SUPPLY: Performed by: THORACIC SURGERY (CARDIOTHORACIC VASCULAR SURGERY)

## 2018-01-19 PROCEDURE — 36000711: Performed by: THORACIC SURGERY (CARDIOTHORACIC VASCULAR SURGERY)

## 2018-01-19 PROCEDURE — 20600001 HC STEP DOWN PRIVATE ROOM

## 2018-01-19 PROCEDURE — 25000003 PHARM REV CODE 250

## 2018-01-19 PROCEDURE — C1729 CATH, DRAINAGE: HCPCS | Performed by: THORACIC SURGERY (CARDIOTHORACIC VASCULAR SURGERY)

## 2018-01-19 PROCEDURE — 88305 TISSUE EXAM BY PATHOLOGIST: CPT | Performed by: PATHOLOGY

## 2018-01-19 PROCEDURE — 32663 THORACOSCOPY W/LOBECTOMY: CPT | Mod: GC,,, | Performed by: THORACIC SURGERY (CARDIOTHORACIC VASCULAR SURGERY)

## 2018-01-19 PROCEDURE — 27100025 HC TUBING, SET FLUID WARMER: Performed by: NURSE ANESTHETIST, CERTIFIED REGISTERED

## 2018-01-19 PROCEDURE — 36000710: Performed by: THORACIC SURGERY (CARDIOTHORACIC VASCULAR SURGERY)

## 2018-01-19 PROCEDURE — 27200677 HC TRANSDUCER MONITOR KIT SINGLE: Performed by: NURSE ANESTHETIST, CERTIFIED REGISTERED

## 2018-01-19 PROCEDURE — 27800505 HC CATH, RADIAL ARTERY KIT: Performed by: NURSE ANESTHETIST, CERTIFIED REGISTERED

## 2018-01-19 PROCEDURE — D9220A PRA ANESTHESIA: Mod: CRNA,,, | Performed by: NURSE ANESTHETIST, CERTIFIED REGISTERED

## 2018-01-19 PROCEDURE — 88331 PATH CONSLTJ SURG 1 BLK 1SPC: CPT | Performed by: PATHOLOGY

## 2018-01-19 PROCEDURE — 94761 N-INVAS EAR/PLS OXIMETRY MLT: CPT

## 2018-01-19 PROCEDURE — 32674 THORACOSCOPY LYMPH NODE EXC: CPT | Mod: GC,,, | Performed by: THORACIC SURGERY (CARDIOTHORACIC VASCULAR SURGERY)

## 2018-01-19 RX ORDER — ROCURONIUM BROMIDE 10 MG/ML
INJECTION, SOLUTION INTRAVENOUS
Status: DISCONTINUED | OUTPATIENT
Start: 2018-01-19 | End: 2018-01-19

## 2018-01-19 RX ORDER — LIDOCAINE HCL/PF 100 MG/5ML
SYRINGE (ML) INTRAVENOUS
Status: DISCONTINUED | OUTPATIENT
Start: 2018-01-19 | End: 2018-01-19

## 2018-01-19 RX ORDER — LOSARTAN POTASSIUM 50 MG/1
50 TABLET ORAL NIGHTLY
Status: DISCONTINUED | OUTPATIENT
Start: 2018-01-20 | End: 2018-01-20

## 2018-01-19 RX ORDER — MAG HYDROX/ALUMINUM HYD/SIMETH 200-200-20
30 SUSPENSION, ORAL (FINAL DOSE FORM) ORAL EVERY 6 HOURS PRN
Status: DISCONTINUED | OUTPATIENT
Start: 2018-01-19 | End: 2018-01-22 | Stop reason: HOSPADM

## 2018-01-19 RX ORDER — LIDOCAINE 50 MG/G
1 PATCH TOPICAL
Status: DISCONTINUED | OUTPATIENT
Start: 2018-01-19 | End: 2018-01-22 | Stop reason: HOSPADM

## 2018-01-19 RX ORDER — CHOLECALCIFEROL (VITAMIN D3) 25 MCG
2000 TABLET ORAL DAILY
Status: DISCONTINUED | OUTPATIENT
Start: 2018-01-20 | End: 2018-01-22 | Stop reason: HOSPADM

## 2018-01-19 RX ORDER — CEFAZOLIN SODIUM 1 G/3ML
2 INJECTION, POWDER, FOR SOLUTION INTRAMUSCULAR; INTRAVENOUS
Status: DISCONTINUED | OUTPATIENT
Start: 2018-01-19 | End: 2018-01-19

## 2018-01-19 RX ORDER — LIDOCAINE HYDROCHLORIDE 10 MG/ML
1 INJECTION, SOLUTION EPIDURAL; INFILTRATION; INTRACAUDAL; PERINEURAL ONCE
Status: COMPLETED | OUTPATIENT
Start: 2018-01-19 | End: 2018-01-19

## 2018-01-19 RX ORDER — SODIUM CHLORIDE 9 MG/ML
INJECTION, SOLUTION INTRAVENOUS CONTINUOUS
Status: DISCONTINUED | OUTPATIENT
Start: 2018-01-19 | End: 2018-01-19

## 2018-01-19 RX ORDER — OXYCODONE HYDROCHLORIDE 5 MG/1
10 TABLET ORAL
Status: DISCONTINUED | OUTPATIENT
Start: 2018-01-19 | End: 2018-01-22 | Stop reason: HOSPADM

## 2018-01-19 RX ORDER — OXYCODONE HYDROCHLORIDE 5 MG/1
10 TABLET ORAL
Status: DISCONTINUED | OUTPATIENT
Start: 2018-01-19 | End: 2018-01-19

## 2018-01-19 RX ORDER — DEXAMETHASONE SODIUM PHOSPHATE 4 MG/ML
INJECTION, SOLUTION INTRA-ARTICULAR; INTRALESIONAL; INTRAMUSCULAR; INTRAVENOUS; SOFT TISSUE
Status: DISCONTINUED | OUTPATIENT
Start: 2018-01-19 | End: 2018-01-19

## 2018-01-19 RX ORDER — SODIUM CHLORIDE AND POTASSIUM CHLORIDE 150; 900 MG/100ML; MG/100ML
INJECTION, SOLUTION INTRAVENOUS CONTINUOUS
Status: DISCONTINUED | OUTPATIENT
Start: 2018-01-19 | End: 2018-01-19

## 2018-01-19 RX ORDER — HYDROMORPHONE HYDROCHLORIDE 2 MG/ML
0.2 INJECTION, SOLUTION INTRAMUSCULAR; INTRAVENOUS; SUBCUTANEOUS EVERY 5 MIN PRN
Status: DISCONTINUED | OUTPATIENT
Start: 2018-01-19 | End: 2018-01-19 | Stop reason: HOSPADM

## 2018-01-19 RX ORDER — ESCITALOPRAM OXALATE 20 MG/1
20 TABLET ORAL NIGHTLY
Status: DISCONTINUED | OUTPATIENT
Start: 2018-01-19 | End: 2018-01-22 | Stop reason: HOSPADM

## 2018-01-19 RX ORDER — PHENYLEPHRINE HYDROCHLORIDE 10 MG/ML
INJECTION INTRAVENOUS
Status: DISCONTINUED | OUTPATIENT
Start: 2018-01-19 | End: 2018-01-19

## 2018-01-19 RX ORDER — CALCIUM CARBONATE 200(500)MG
1000 TABLET,CHEWABLE ORAL EVERY 8 HOURS PRN
Status: DISCONTINUED | OUTPATIENT
Start: 2018-01-19 | End: 2018-01-22 | Stop reason: HOSPADM

## 2018-01-19 RX ORDER — FENTANYL CITRATE 50 UG/ML
25 INJECTION, SOLUTION INTRAMUSCULAR; INTRAVENOUS EVERY 5 MIN PRN
Status: COMPLETED | OUTPATIENT
Start: 2018-01-19 | End: 2018-01-19

## 2018-01-19 RX ORDER — PANTOPRAZOLE SODIUM 40 MG/1
40 TABLET, DELAYED RELEASE ORAL
Status: DISCONTINUED | OUTPATIENT
Start: 2018-01-20 | End: 2018-01-22 | Stop reason: HOSPADM

## 2018-01-19 RX ORDER — PROPOFOL 10 MG/ML
VIAL (ML) INTRAVENOUS
Status: DISCONTINUED | OUTPATIENT
Start: 2018-01-19 | End: 2018-01-19

## 2018-01-19 RX ORDER — IBUPROFEN 200 MG
1 TABLET ORAL DAILY
Status: DISCONTINUED | OUTPATIENT
Start: 2018-01-19 | End: 2018-01-22 | Stop reason: HOSPADM

## 2018-01-19 RX ORDER — METOCLOPRAMIDE HYDROCHLORIDE 5 MG/ML
5 INJECTION INTRAMUSCULAR; INTRAVENOUS EVERY 6 HOURS PRN
Status: DISCONTINUED | OUTPATIENT
Start: 2018-01-19 | End: 2018-01-22 | Stop reason: HOSPADM

## 2018-01-19 RX ORDER — CEFAZOLIN SODIUM 2 G/50ML
2 SOLUTION INTRAVENOUS
Status: COMPLETED | OUTPATIENT
Start: 2018-01-19 | End: 2018-01-20

## 2018-01-19 RX ORDER — ACETAMINOPHEN 10 MG/ML
INJECTION, SOLUTION INTRAVENOUS
Status: DISCONTINUED | OUTPATIENT
Start: 2018-01-19 | End: 2018-01-19

## 2018-01-19 RX ORDER — OXYCODONE HYDROCHLORIDE 5 MG/1
15 TABLET ORAL
Status: DISCONTINUED | OUTPATIENT
Start: 2018-01-19 | End: 2018-01-22 | Stop reason: HOSPADM

## 2018-01-19 RX ORDER — BACITRACIN 50000 [IU]/1
INJECTION, POWDER, FOR SOLUTION INTRAMUSCULAR
Status: DISCONTINUED | OUTPATIENT
Start: 2018-01-19 | End: 2018-01-19 | Stop reason: HOSPADM

## 2018-01-19 RX ORDER — SODIUM CHLORIDE AND POTASSIUM CHLORIDE 150; 900 MG/100ML; MG/100ML
INJECTION, SOLUTION INTRAVENOUS
Status: COMPLETED
Start: 2018-01-19 | End: 2018-01-19

## 2018-01-19 RX ORDER — SODIUM CHLORIDE 0.9 % (FLUSH) 0.9 %
3 SYRINGE (ML) INJECTION
Status: DISCONTINUED | OUTPATIENT
Start: 2018-01-19 | End: 2018-01-19

## 2018-01-19 RX ORDER — TIZANIDINE 4 MG/1
4 TABLET ORAL EVERY 8 HOURS
Status: DISCONTINUED | OUTPATIENT
Start: 2018-01-19 | End: 2018-01-22 | Stop reason: HOSPADM

## 2018-01-19 RX ORDER — OXYCODONE HYDROCHLORIDE 5 MG/1
5 TABLET ORAL
Status: DISCONTINUED | OUTPATIENT
Start: 2018-01-19 | End: 2018-01-19

## 2018-01-19 RX ORDER — LEVOTHYROXINE SODIUM 88 UG/1
88 TABLET ORAL
Status: DISCONTINUED | OUTPATIENT
Start: 2018-01-20 | End: 2018-01-22 | Stop reason: HOSPADM

## 2018-01-19 RX ORDER — FENTANYL CITRATE 50 UG/ML
INJECTION, SOLUTION INTRAMUSCULAR; INTRAVENOUS
Status: COMPLETED
Start: 2018-01-19 | End: 2018-01-19

## 2018-01-19 RX ORDER — CEFAZOLIN SODIUM 1 G/3ML
2 INJECTION, POWDER, FOR SOLUTION INTRAMUSCULAR; INTRAVENOUS
Status: COMPLETED | OUTPATIENT
Start: 2018-01-19 | End: 2018-01-19

## 2018-01-19 RX ORDER — FENTANYL CITRATE 50 UG/ML
INJECTION, SOLUTION INTRAMUSCULAR; INTRAVENOUS
Status: DISCONTINUED | OUTPATIENT
Start: 2018-01-19 | End: 2018-01-19

## 2018-01-19 RX ORDER — IPRATROPIUM BROMIDE AND ALBUTEROL SULFATE 2.5; .5 MG/3ML; MG/3ML
SOLUTION RESPIRATORY (INHALATION)
Status: COMPLETED
Start: 2018-01-19 | End: 2018-01-19

## 2018-01-19 RX ORDER — KETAMINE HYDROCHLORIDE 100 MG/ML
INJECTION, SOLUTION INTRAMUSCULAR; INTRAVENOUS
Status: DISCONTINUED | OUTPATIENT
Start: 2018-01-19 | End: 2018-01-19

## 2018-01-19 RX ORDER — BISACODYL 10 MG
10 SUPPOSITORY, RECTAL RECTAL DAILY PRN
Status: DISCONTINUED | OUTPATIENT
Start: 2018-01-19 | End: 2018-01-22 | Stop reason: HOSPADM

## 2018-01-19 RX ORDER — LACTULOSE 10 G/15ML
20 SOLUTION ORAL EVERY 6 HOURS PRN
Status: DISCONTINUED | OUTPATIENT
Start: 2018-01-19 | End: 2018-01-22 | Stop reason: HOSPADM

## 2018-01-19 RX ORDER — SODIUM CHLORIDE 0.9 % (FLUSH) 0.9 %
3 SYRINGE (ML) INJECTION EVERY 8 HOURS
Status: DISCONTINUED | OUTPATIENT
Start: 2018-01-19 | End: 2018-01-22 | Stop reason: HOSPADM

## 2018-01-19 RX ORDER — OXYBUTYNIN CHLORIDE 5 MG/1
10 TABLET, EXTENDED RELEASE ORAL NIGHTLY
Status: DISCONTINUED | OUTPATIENT
Start: 2018-01-19 | End: 2018-01-22 | Stop reason: HOSPADM

## 2018-01-19 RX ORDER — IPRATROPIUM BROMIDE AND ALBUTEROL SULFATE 2.5; .5 MG/3ML; MG/3ML
3 SOLUTION RESPIRATORY (INHALATION)
Status: DISCONTINUED | OUTPATIENT
Start: 2018-01-19 | End: 2018-01-22 | Stop reason: HOSPADM

## 2018-01-19 RX ORDER — ATORVASTATIN CALCIUM 20 MG/1
40 TABLET, FILM COATED ORAL NIGHTLY
Status: DISCONTINUED | OUTPATIENT
Start: 2018-01-19 | End: 2018-01-22 | Stop reason: HOSPADM

## 2018-01-19 RX ORDER — METOPROLOL TARTRATE 1 MG/ML
INJECTION, SOLUTION INTRAVENOUS
Status: DISCONTINUED | OUTPATIENT
Start: 2018-01-19 | End: 2018-01-19

## 2018-01-19 RX ORDER — POLYETHYLENE GLYCOL 3350 17 G/17G
17 POWDER, FOR SOLUTION ORAL DAILY
Status: DISCONTINUED | OUTPATIENT
Start: 2018-01-19 | End: 2018-01-22 | Stop reason: HOSPADM

## 2018-01-19 RX ORDER — IBUPROFEN 400 MG/1
400 TABLET ORAL EVERY 6 HOURS
Status: DISCONTINUED | OUTPATIENT
Start: 2018-01-19 | End: 2018-01-22 | Stop reason: HOSPADM

## 2018-01-19 RX ORDER — ONDANSETRON 8 MG/1
8 TABLET, ORALLY DISINTEGRATING ORAL EVERY 8 HOURS PRN
Status: DISCONTINUED | OUTPATIENT
Start: 2018-01-19 | End: 2018-01-22 | Stop reason: HOSPADM

## 2018-01-19 RX ORDER — NEOSTIGMINE METHYLSULFATE 1 MG/ML
INJECTION, SOLUTION INTRAVENOUS
Status: DISCONTINUED | OUTPATIENT
Start: 2018-01-19 | End: 2018-01-19

## 2018-01-19 RX ORDER — ACETAMINOPHEN 500 MG
1000 TABLET ORAL EVERY 8 HOURS
Status: DISCONTINUED | OUTPATIENT
Start: 2018-01-19 | End: 2018-01-22 | Stop reason: HOSPADM

## 2018-01-19 RX ORDER — SUCCINYLCHOLINE CHLORIDE 20 MG/ML
INJECTION INTRAMUSCULAR; INTRAVENOUS
Status: DISCONTINUED | OUTPATIENT
Start: 2018-01-19 | End: 2018-01-19

## 2018-01-19 RX ORDER — ONDANSETRON 2 MG/ML
INJECTION INTRAMUSCULAR; INTRAVENOUS
Status: DISCONTINUED | OUTPATIENT
Start: 2018-01-19 | End: 2018-01-19

## 2018-01-19 RX ORDER — MIDAZOLAM HYDROCHLORIDE 1 MG/ML
INJECTION INTRAMUSCULAR; INTRAVENOUS
Status: DISCONTINUED | OUTPATIENT
Start: 2018-01-19 | End: 2018-01-19

## 2018-01-19 RX ORDER — MUPIROCIN 20 MG/G
1 OINTMENT TOPICAL 2 TIMES DAILY
Status: DISCONTINUED | OUTPATIENT
Start: 2018-01-19 | End: 2018-01-19

## 2018-01-19 RX ORDER — TIZANIDINE 4 MG/1
4 TABLET ORAL NIGHTLY
Status: DISCONTINUED | OUTPATIENT
Start: 2018-01-19 | End: 2018-01-19

## 2018-01-19 RX ORDER — ENOXAPARIN SODIUM 100 MG/ML
40 INJECTION SUBCUTANEOUS
Status: DISCONTINUED | OUTPATIENT
Start: 2018-01-20 | End: 2018-01-22 | Stop reason: HOSPADM

## 2018-01-19 RX ORDER — GLYCOPYRROLATE 0.2 MG/ML
INJECTION INTRAMUSCULAR; INTRAVENOUS
Status: DISCONTINUED | OUTPATIENT
Start: 2018-01-19 | End: 2018-01-19

## 2018-01-19 RX ORDER — OXYCODONE HYDROCHLORIDE 5 MG/1
TABLET ORAL
Status: COMPLETED
Start: 2018-01-19 | End: 2018-01-19

## 2018-01-19 RX ADMIN — FENTANYL CITRATE 100 MCG: 50 INJECTION, SOLUTION INTRAMUSCULAR; INTRAVENOUS at 07:01

## 2018-01-19 RX ADMIN — PHENYLEPHRINE HYDROCHLORIDE 50 MCG: 10 INJECTION INTRAVENOUS at 09:01

## 2018-01-19 RX ADMIN — Medication 3 ML: at 10:01

## 2018-01-19 RX ADMIN — ONDANSETRON 4 MG: 2 INJECTION INTRAMUSCULAR; INTRAVENOUS at 11:01

## 2018-01-19 RX ADMIN — OXYCODONE HYDROCHLORIDE 15 MG: 5 TABLET ORAL at 05:01

## 2018-01-19 RX ADMIN — FENTANYL CITRATE 25 MCG: 50 INJECTION, SOLUTION INTRAMUSCULAR; INTRAVENOUS at 01:01

## 2018-01-19 RX ADMIN — HYDROMORPHONE HYDROCHLORIDE 0.2 MG: 2 INJECTION INTRAMUSCULAR; INTRAVENOUS; SUBCUTANEOUS at 03:01

## 2018-01-19 RX ADMIN — ROCURONIUM BROMIDE 40 MG: 10 INJECTION, SOLUTION INTRAVENOUS at 07:01

## 2018-01-19 RX ADMIN — PROPOFOL 10 MG: 10 INJECTION, EMULSION INTRAVENOUS at 07:01

## 2018-01-19 RX ADMIN — FENTANYL CITRATE 50 MCG: 50 INJECTION, SOLUTION INTRAMUSCULAR; INTRAVENOUS at 08:01

## 2018-01-19 RX ADMIN — METOPROLOL TARTRATE 2 MG: 5 INJECTION, SOLUTION INTRAVENOUS at 11:01

## 2018-01-19 RX ADMIN — KETAMINE HYDROCHLORIDE 30 MG: 100 INJECTION, SOLUTION, CONCENTRATE INTRAMUSCULAR; INTRAVENOUS at 11:01

## 2018-01-19 RX ADMIN — IPRATROPIUM BROMIDE AND ALBUTEROL SULFATE 3 ML: .5; 3 SOLUTION RESPIRATORY (INHALATION) at 12:01

## 2018-01-19 RX ADMIN — HYDROMORPHONE HYDROCHLORIDE 0.2 MG: 2 INJECTION INTRAMUSCULAR; INTRAVENOUS; SUBCUTANEOUS at 02:01

## 2018-01-19 RX ADMIN — ROCURONIUM BROMIDE 10 MG: 10 INJECTION, SOLUTION INTRAVENOUS at 07:01

## 2018-01-19 RX ADMIN — TIZANIDINE 4 MG: 4 TABLET ORAL at 09:01

## 2018-01-19 RX ADMIN — PROPOFOL 10 MG: 10 INJECTION, EMULSION INTRAVENOUS at 09:01

## 2018-01-19 RX ADMIN — METOPROLOL TARTRATE 1 MG: 5 INJECTION, SOLUTION INTRAVENOUS at 10:01

## 2018-01-19 RX ADMIN — IPRATROPIUM BROMIDE AND ALBUTEROL SULFATE 3 ML: .5; 3 SOLUTION RESPIRATORY (INHALATION) at 03:01

## 2018-01-19 RX ADMIN — METOPROLOL TARTRATE 2 MG: 5 INJECTION, SOLUTION INTRAVENOUS at 10:01

## 2018-01-19 RX ADMIN — KETAMINE HYDROCHLORIDE 35 MG: 100 INJECTION, SOLUTION, CONCENTRATE INTRAMUSCULAR; INTRAVENOUS at 07:01

## 2018-01-19 RX ADMIN — PROPOFOL 80 MG: 10 INJECTION, EMULSION INTRAVENOUS at 07:01

## 2018-01-19 RX ADMIN — SUCCINYLCHOLINE CHLORIDE 140 MG: 20 INJECTION, SOLUTION INTRAMUSCULAR; INTRAVENOUS at 07:01

## 2018-01-19 RX ADMIN — LIDOCAINE 1 PATCH: 50 PATCH TOPICAL at 05:01

## 2018-01-19 RX ADMIN — PROPOFOL 20 MG: 10 INJECTION, EMULSION INTRAVENOUS at 08:01

## 2018-01-19 RX ADMIN — IBUPROFEN 400 MG: 400 TABLET, FILM COATED ORAL at 11:01

## 2018-01-19 RX ADMIN — DEXAMETHASONE SODIUM PHOSPHATE 4 MG: 4 INJECTION, SOLUTION INTRAMUSCULAR; INTRAVENOUS at 11:01

## 2018-01-19 RX ADMIN — POTASSIUM CHLORIDE AND SODIUM CHLORIDE: 900; 150 INJECTION, SOLUTION INTRAVENOUS at 12:01

## 2018-01-19 RX ADMIN — ROCURONIUM BROMIDE 20 MG: 10 INJECTION, SOLUTION INTRAVENOUS at 08:01

## 2018-01-19 RX ADMIN — OXYCODONE HYDROCHLORIDE 15 MG: 5 TABLET ORAL at 12:01

## 2018-01-19 RX ADMIN — LIDOCAINE HYDROCHLORIDE 1 MG: 10 INJECTION, SOLUTION EPIDURAL; INFILTRATION; INTRACAUDAL; PERINEURAL at 06:01

## 2018-01-19 RX ADMIN — PHENYLEPHRINE HYDROCHLORIDE 200 MCG: 10 INJECTION INTRAVENOUS at 08:01

## 2018-01-19 RX ADMIN — BACITRACIN 50000 UNITS: 50000 INJECTION, POWDER, LYOPHILIZED, FOR SOLUTION INTRAMUSCULAR at 11:01

## 2018-01-19 RX ADMIN — BUPIVACAINE 266 MG: 13.3 INJECTION, SUSPENSION, LIPOSOMAL INFILTRATION at 11:01

## 2018-01-19 RX ADMIN — PHENYLEPHRINE HYDROCHLORIDE 100 MCG: 10 INJECTION INTRAVENOUS at 09:01

## 2018-01-19 RX ADMIN — SODIUM CHLORIDE AND POTASSIUM CHLORIDE: 150; 900 INJECTION, SOLUTION INTRAVENOUS at 12:01

## 2018-01-19 RX ADMIN — PROPOFOL 20 MG: 10 INJECTION, EMULSION INTRAVENOUS at 07:01

## 2018-01-19 RX ADMIN — LIDOCAINE HYDROCHLORIDE 100 MG: 20 INJECTION, SOLUTION INTRAVENOUS at 07:01

## 2018-01-19 RX ADMIN — FENTANYL CITRATE 25 MCG: 50 INJECTION, SOLUTION INTRAMUSCULAR; INTRAVENOUS at 02:01

## 2018-01-19 RX ADMIN — CEFAZOLIN 2 G: 330 INJECTION, POWDER, FOR SOLUTION INTRAMUSCULAR; INTRAVENOUS at 07:01

## 2018-01-19 RX ADMIN — SODIUM CHLORIDE: 0.9 INJECTION, SOLUTION INTRAVENOUS at 07:01

## 2018-01-19 RX ADMIN — PROPOFOL 30 MG: 10 INJECTION, EMULSION INTRAVENOUS at 08:01

## 2018-01-19 RX ADMIN — FENTANYL CITRATE 50 MCG: 50 INJECTION, SOLUTION INTRAMUSCULAR; INTRAVENOUS at 11:01

## 2018-01-19 RX ADMIN — CEFAZOLIN SODIUM 2 G: 2 SOLUTION INTRAVENOUS at 10:01

## 2018-01-19 RX ADMIN — IBUPROFEN 400 MG: 400 TABLET, FILM COATED ORAL at 05:01

## 2018-01-19 RX ADMIN — IPRATROPIUM BROMIDE AND ALBUTEROL SULFATE 3 ML: .5; 3 SOLUTION RESPIRATORY (INHALATION) at 08:01

## 2018-01-19 RX ADMIN — SODIUM CHLORIDE: 0.9 INJECTION, SOLUTION INTRAVENOUS at 06:01

## 2018-01-19 RX ADMIN — ATORVASTATIN CALCIUM 40 MG: 20 TABLET, FILM COATED ORAL at 09:01

## 2018-01-19 RX ADMIN — MIDAZOLAM HYDROCHLORIDE 2 MG: 1 INJECTION, SOLUTION INTRAMUSCULAR; INTRAVENOUS at 06:01

## 2018-01-19 RX ADMIN — OXYCODONE HYDROCHLORIDE 15 MG: 5 TABLET ORAL at 08:01

## 2018-01-19 RX ADMIN — CEFAZOLIN 2 G: 330 INJECTION, POWDER, FOR SOLUTION INTRAMUSCULAR; INTRAVENOUS at 03:01

## 2018-01-19 RX ADMIN — GLYCOPYRROLATE 0.4 MG: 0.2 INJECTION, SOLUTION INTRAMUSCULAR; INTRAVENOUS at 11:01

## 2018-01-19 RX ADMIN — ACETAMINOPHEN 1000 MG: 10 INJECTION, SOLUTION INTRAVENOUS at 11:01

## 2018-01-19 RX ADMIN — OXYCODONE HYDROCHLORIDE 15 MG: 5 TABLET ORAL at 11:01

## 2018-01-19 RX ADMIN — OXYBUTYNIN CHLORIDE 10 MG: 5 TABLET, EXTENDED RELEASE ORAL at 09:01

## 2018-01-19 RX ADMIN — ESCITALOPRAM 20 MG: 20 TABLET, FILM COATED ORAL at 09:01

## 2018-01-19 RX ADMIN — ACETAMINOPHEN 1000 MG: 500 TABLET ORAL at 09:01

## 2018-01-19 RX ADMIN — ROCURONIUM BROMIDE 10 MG: 10 INJECTION, SOLUTION INTRAVENOUS at 10:01

## 2018-01-19 RX ADMIN — KETAMINE HYDROCHLORIDE 35 MG: 100 INJECTION, SOLUTION, CONCENTRATE INTRAMUSCULAR; INTRAVENOUS at 10:01

## 2018-01-19 RX ADMIN — NEOSTIGMINE METHYLSULFATE 4 MG: 1 INJECTION INTRAVENOUS at 11:01

## 2018-01-19 RX ADMIN — ROCURONIUM BROMIDE 10 MG: 10 INJECTION, SOLUTION INTRAVENOUS at 09:01

## 2018-01-19 RX ADMIN — FENTANYL CITRATE 50 MCG: 50 INJECTION, SOLUTION INTRAMUSCULAR; INTRAVENOUS at 07:01

## 2018-01-19 NOTE — H&P (VIEW-ONLY)
History & Physical  Surgery      SUBJECTIVE:     Chief Complaint/Reason for Admission: Right Lower Lobe Adenocarcinoma     History of Present Illness: Chhaya Sharp is a 72 y.o. 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 This nodule was found after the patient had a CXR done for pre-op clearance ahead of a knee replacement. CT was done showing the right lower lobe mass as well as a 6.4mm nodule in the right upper lobe that did not light up on PET scan; however there was a 6mm cervical node of unclear etiology that was noted on the PET. She remains active, does her own shopping and housekeeping, but lives with her daughter mainly due to orthopedic issues. She has smoked 2-3 packs per day since she was 15, but recently cut back to 1/2 pack per day. She denies recurrent pneumonia, SOB, cough, bloody sputum, recent weight loss, or fatigue. She mainly endorses joint pain. Patient and daughters state she has already undergone PFTs and Cardiac clearance.     No current outpatient prescriptions on file prior to visit.     No current facility-administered medications on file prior to visit.        Review of patient's allergies indicates:  No Known Allergies    No past medical history on file.  No past surgical history on file.  No family history on file.  Social History   Substance Use Topics    Smoking status: Not on file    Smokeless tobacco: Not on file    Alcohol use Not on file        Review of Systems   Constitutional: Negative for chills, fatigue, fever and unexpected weight change.   Respiratory: Negative for cough, shortness of breath and wheezing.    Cardiovascular: Negative for chest pain and palpitations.   Gastrointestinal: Negative for abdominal distention, blood in stool, constipation, diarrhea, nausea  and vomiting.   Musculoskeletal: Positive for arthralgias and back pain.          OBJECTIVE:     Vital Signs (Most Recent)  Pulse: 88 (01/04/18 1115)  BP: 130/74 (01/04/18 1115)  SpO2: 96 % (01/04/18 1115)    Physical Exam   Constitutional: She is oriented to person, place, and time. She appears well-developed and well-nourished. No distress.   HENT:   Head: Normocephalic and atraumatic.   Eyes: EOM are normal. Pupils are equal, round, and reactive to light.   Cardiovascular: Normal rate and regular rhythm.    Pulmonary/Chest: Effort normal. No respiratory distress. She has no wheezes.   Abdominal: Soft. She exhibits no distension. There is no tenderness. There is no guarding.   Musculoskeletal: Normal range of motion.   Neurological: She is alert and oriented to person, place, and time. Coordination normal.   Skin: Skin is warm and dry.   Psychiatric: She has a normal mood and affect. Her behavior is normal. Judgment and thought content normal.     Diagnostic Results:  PFTs:  FEV1 84.7% pre, 88.5% post  DLCO: 59.2 %    PET:  A metabolically active lesion is identified within the left cervical soft tissues. SUV max is 7.6. Findings are felt to correspond to a level 2 lymph node measuring 6mm in the short axis.     The patients known right lower lobe mass is metabolically active. SUV max is 14.8 On today's examination, this mass measures 3.6x2.6x4.8cm, not significantly changed compared to the previous Ct. No additional metabolically active pulmonary mass is present. No metabolic thoracic adenopathy.     No additional focus of abnormal FDG metabolism    ASSESSMENT/PLAN:     A/P:    71 y/o F with a cT2 Adenocarcinoma of the Right Lower Lobe without thoracic adenopathy on CT or PET. There is a concerning Level 2 node in the neck that is of unclear etiology. We plan on discussing what to do with this node at tumor conference next week.     We discussed treatment options regarding her adenocarcinoma with the patient and  her daughters with her today in clinic. After discussion of the risks and benefits of surgery, she has elected to proceed with surgical resection. Consents obtained today in office.     - Discuss cervical node at tumor conference   - PreOp clinic  - Scan media into epic  - Plan for Bronch, Mediastinoscopy, Right VATS with Right Lower Lobectomy Jan 19th       Mark Pugh MD   [phone]  General Surgery [facility]    ATTENDING ATTESTATION:    I evaluated the patient and I agree with the assessment and plan.  Incidentally found RLL mass, biopsy-proven adenocarcinoma.  Has subcentimeter RUL nodule that is not PET-avid and a subcentimeter left cervical lymph node that is PET-avid but is likely reactive.  PFTs and functional status support lobectomy.  Reportedly cleared by her cardiologist for surgery, will obtain note.  Long discussion with patient and her family about the indication and rationale for lung resection, the details of the operation, as well as its risks, benefits and potential outcomes.  We also discussed the alternative treatments, including definitive chemoradiation, and the risks of no treatment.  They were given the opportunity to ask questions and I answered all of their questions to their satisfaction.  They demonstrated understanding and appreciation of above info.  She has decided to proceed with Flexible Bronchsocpy, Cervical Mediastinoscopy with Biopsy, Right Thoracoscopy with Upper Lobectomy and Mediastinal Lymphadenectomy, possible Thoracotomy on 1/19/2018.

## 2018-01-19 NOTE — INTERVAL H&P NOTE
The patient has been examined and the H&P has been reviewed:    I concur with the findings and no changes have occurred since H&P was written.    Anesthesia/Surgery risks, benefits and alternative options discussed and understood by patient/family.          Active Hospital Problems    Diagnosis  POA    Adenocarcinoma, lung [C34.90]  Yes      Resolved Hospital Problems    Diagnosis Date Resolved POA   No resolved problems to display.

## 2018-01-19 NOTE — PLAN OF CARE
Pt ready for surgery. Resting comfortably in bed. Daughter at bedside. Awaiting anesthesia to obtain consent.

## 2018-01-19 NOTE — ANESTHESIA PROCEDURE NOTES
Arterial    Diagnosis: R Lung Adenocarcinoma     Patient location during procedure: done in OR  Procedure start time: 1/19/2018 7:22 AM  Timeout: 1/19/2018 7:21 AM  Procedure end time: 1/19/2018 7:23 AM  Staffing  Resident/CRNA: LISANDRO CHAUDHRY  Performed: resident/CRNA   Preanesthetic Checklist  Completed: patient identified, site marked, surgical consent, pre-op evaluation, timeout performed, IV checked, risks and benefits discussed, monitors and equipment checked and anesthesia consent givenArterial  Skin Prep: chlorhexidine gluconate  Local Infiltration: none  Orientation: left  Location: radial  Catheter Size: 20 G  Catheter placement by Anatomical landmarks. Heme positive aspiration all ports.Insertion Attempts: 1  Assessment  Dressing: secured with tape and tegaderm  Patient: Tolerated well

## 2018-01-19 NOTE — BRIEF OP NOTE
[facility]  Brief Operative Note    SUMMARY     Surgery Date: 1/19/2018     Surgeon(s) and Role:     * Gregorio Khan MD - Primary     * Sharan Garcia MD - Fellow    Assisting Surgeon: None    Pre-op Diagnosis:  Adenocarcinoma of lung, right [C34.91]    Post-op Diagnosis:  Post-Op Diagnosis Codes:     * Adenocarcinoma of lung, right [C34.91]    Procedure(s) (LRB):  THORACOSCOPY-VIDEO ASSISTED (VATS) W/ LOBECTOMY (Right)  DISSECTION-LYMPH NODE (Right)  BRONCHOSCOPY-OPERATIVE,FLEXIBLE (N/A)  MEDIASTINOSCOPY (N/A)    Anesthesia: General    Description of the findings of the procedure: tumor contained in the lower lobe    Estimated Blood Loss: 100 ml         Specimens:   Specimen (12h ago through future)    Start     Ordered    01/19/18 1130  Specimen to Pathology - Surgery  Once     Comments:  1. Level 4R lymph node - Frozen2. Level 7 lymph node - Frozen3. Level 4L lymph node - Frozen4. Level 9 lymph node - PERM5. Level 8 lymph node - PERM6. Level 11 lymph node - PERM7. Level 12 lymph node - PERM8. Right lower lobe - PERM9. Level 4R lymph node #2 - PERM      01/19/18 1130

## 2018-01-19 NOTE — PROGRESS NOTES
Dr. Khan at beside, chest tube placed to suction per MD. Will review CXR and make decision after reviewing CXR.

## 2018-01-19 NOTE — OP NOTE
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 Diagnostic Flexible Bronchoscopy.  2. Cervical Mediastinoscopy with Biopsy.  3. Right Thoracoscopy with Lower Lobectomy.  4. Right Thoracoscopy with Mediastinal and Regional Lymphadenectomy    Surgeon: Gregorio Khan MD    Assistant(s): Sharan Garcia DO; Adam Noonan MD    Anesthesia: GETA    Findings: Normal Bronchoscopy.  Mediastinal LN negative on frozen section.  No evidence of extraparenchymal disease.    Estimated Blood Loss: 100mL    Drains: 24 Martiniquais straight chest tube    Specimen(s): Levels 4R, 4L, 7 from mediastinoscopy; Right Lower Lobe; Levels 4R, 8, 9, 11, 12    Complications: None    Indications for Procedure: 71 yo female with biopsy-proven adenocarcinoma of the lower lobe of the right lung    Procedure in Detail: The patient was taken to the operating room and placed supine on the operating table.  Adequate general anesthesia was achieved.  Time-out was performed.  Flexible bronchoscope was passed through the endotracheal tube and the entire airway was examined to the segmental level.  There were no endobronchial lesions or excessive secretions.  Scope was removed.  A transverse shoulder roll was placed and the neck maximally extended without the head floating.  Arms were tucked at her side and padded appropriately.  The chin, neck and chest were prepped and draped in standard sterile fashion.  Prophylactic intravenous antibiotics were administered.  A small collar incision was made one fingerbreadth cephalad to the sternal notch. The platysma and subcutaneous tissue were divided. The strap muscles were retracted laterally. The pretracheal fascia was incised and the mediastinum was accessed with blunt finger dissection.  The mediastinoscope was inserted.  Lymph nodes were identified at the 4R, 4L and 7 stations  Multiple biopsies were taken  from each of these stations.  Frozen section revealed no malignancy in any of the specimens.  Hemostasis was adequate. Scope was removed.  Incision was closed in layers with absorbable suture. Steri-strips and sterile dressing were applied. All sponge, needle and instrument counts were correct at the end of this portion of the case.  Double lumen endotracheal tube was placed and its position and function were confirmed with bronchoscopy.  She was turned to the left lateral decubitus position, padded appropriately and secured.  Right chest was prepped and draped in standard sterile fashion.  Right lung was isolated.  Time-out was again performed.  A 1.5cm incision was made in the 8th intercostal space in the posterior axillary line.  Port and camera were inserted.  There was no evidence of extraparenchymal disease.  A 4cm incision was made in the 5th intercostal space in the anterior axillary line.  Subcutaneous tissue was divided with electrocautery and the chest was entered.  Wound protector was placed.  The lung was grasped and retracted cephalad.  The inferior pulmonary ligament was divided using electrocautery.  The posterior pleural reflection was then divided using electrocautery.  The lung was then retracted posteriorly and the pleura was divided over the anterior hilum.  Superior, middle and inferior pulmonary veins were identified.  The inferior pulmonary vein was then dissected free and isolated.  The lower lobe was then retracted inferiorly.  The major fissure was completed anteriorly using an EndoGIA purple load.  The ongoing pulmonary artery was identified.  The basilar and superior segmental branches of the pulmonary artery were isolated, then ligated and divided using a single EndoGIA 45mm tan load.  The inferior pulmonary vein was then ligated and divided using an EndoGIA 45mm tan load.  The lower lobe bronchus was then isolated and an EndoGIA 45mm purple load was passed around it and closed.  Gentle  ventilation provided aeration to the upper and middle lobes.  The lower lobe bronchus was then ligated and divided by firing the stapler.  The major fissure was completed posteriorly using EndoGIA purple loads.  Mediastinal lymphadenectomy was then completed to include Levels 4R, 8 and 9.  The upper lobe was palpated and the subsolid nodule seen on CT was not identified.  Then, 20mL of 1.3% Exparel was mixed with 10mL normal saline and used for a regional intercostal thoracic block; 3ml were injected into the intercostal spaces from T3-T10 under direct vision after aspiration to ensure extravascular injection.  The chest was irrigated with one liter of sterile water followed by one liter of sterile saline containing 50,000 Units of Bacitracin.  All irrigation was evacuated.  Hemostasis was excellent.  A 24 Moldovan straight chest tube was passed through the camera port and directed posterior-apically.  The lung was inflated under direct vision and filled the chest cavity nicely.  Tube was secured to the skin with Neurolon suture.  Utility incision was closed in layers with absorbable suture.  Steri-strips and sterile dressings were applied.  All sponge, needle and instrument counts were correct at the end of the case.  The patient tolerated the procedure well.  There were no immediate complications.  She was extubated in the operating room.      Disposition: PACU in stable condition

## 2018-01-19 NOTE — TRANSFER OF CARE
"Anesthesia Transfer of Care Note    Patient: Chhaya Sharp    Procedure(s) Performed: Procedure(s) (LRB):  THORACOSCOPY-VIDEO ASSISTED (VATS) W/ LOBECTOMY (Right)  DISSECTION-LYMPH NODE (Right)  BRONCHOSCOPY-OPERATIVE,FLEXIBLE (N/A)  MEDIASTINOSCOPY (N/A)    Patient location: PACU    Anesthesia Type: general    Transport from OR: Transported from OR on 6-10 L/min O2 by face mask with adequate spontaneous ventilation    Post pain: adequate analgesia    Post assessment: no apparent anesthetic complications    Post vital signs: stable    Level of consciousness: sedated    Nausea/Vomiting: no nausea/vomiting    Complications: none    Transfer of care protocol was followed      Last vitals:   Visit Vitals  /72   Pulse 73   Temp 36.1 °C (96.9 °F) (Axillary)   Resp 14   Ht 5' 4" (1.626 m)   Wt 72.6 kg (160 lb)   SpO2 100%   Breastfeeding? No   BMI 27.46 kg/m²     "

## 2018-01-19 NOTE — NURSING TRANSFER
Nursing Transfer Note      1/19/2018     Transfer To: 646A    Transfer via bed    Transfer with cardiac monitoring,o2, Continuous Pulse ox in place    Transported by ISI Perez    Medicines sent: none    Chart send with patient: Yes    Notified: daughter    Patient reassessed at: 1/19/18 1600  Upon arrival to floor: cardiac monitor applied, patient oriented to room, call bell in reach and bed in lowest position

## 2018-01-20 LAB
BASOPHILS # BLD AUTO: 0.05 K/UL
BASOPHILS NFR BLD: 0.4 %
DIFFERENTIAL METHOD: ABNORMAL
EOSINOPHIL # BLD AUTO: 0 K/UL
EOSINOPHIL NFR BLD: 0.1 %
ERYTHROCYTE [DISTWIDTH] IN BLOOD BY AUTOMATED COUNT: 13.1 %
HCT VFR BLD AUTO: 36.5 %
HGB BLD-MCNC: 11.6 G/DL
IMM GRANULOCYTES # BLD AUTO: 0.04 K/UL
IMM GRANULOCYTES NFR BLD AUTO: 0.3 %
LYMPHOCYTES # BLD AUTO: 2.2 K/UL
LYMPHOCYTES NFR BLD: 16 %
MCH RBC QN AUTO: 29.9 PG
MCHC RBC AUTO-ENTMCNC: 31.8 G/DL
MCV RBC AUTO: 94 FL
MONOCYTES # BLD AUTO: 0.8 K/UL
MONOCYTES NFR BLD: 5.9 %
NEUTROPHILS # BLD AUTO: 10.6 K/UL
NEUTROPHILS NFR BLD: 77.3 %
NRBC BLD-RTO: 0 /100 WBC
PLATELET # BLD AUTO: 250 K/UL
PMV BLD AUTO: 10.2 FL
RBC # BLD AUTO: 3.88 M/UL
WBC # BLD AUTO: 13.73 K/UL

## 2018-01-20 PROCEDURE — 20600001 HC STEP DOWN PRIVATE ROOM

## 2018-01-20 PROCEDURE — 85025 COMPLETE CBC W/AUTO DIFF WBC: CPT

## 2018-01-20 PROCEDURE — A4216 STERILE WATER/SALINE, 10 ML: HCPCS | Performed by: THORACIC SURGERY (CARDIOTHORACIC VASCULAR SURGERY)

## 2018-01-20 PROCEDURE — 25000003 PHARM REV CODE 250: Performed by: THORACIC SURGERY (CARDIOTHORACIC VASCULAR SURGERY)

## 2018-01-20 PROCEDURE — 25000003 PHARM REV CODE 250: Performed by: PHYSICIAN ASSISTANT

## 2018-01-20 PROCEDURE — S4991 NICOTINE PATCH NONLEGEND: HCPCS | Performed by: THORACIC SURGERY (CARDIOTHORACIC VASCULAR SURGERY)

## 2018-01-20 PROCEDURE — 97162 PT EVAL MOD COMPLEX 30 MIN: CPT

## 2018-01-20 PROCEDURE — 36415 COLL VENOUS BLD VENIPUNCTURE: CPT

## 2018-01-20 PROCEDURE — 94761 N-INVAS EAR/PLS OXIMETRY MLT: CPT

## 2018-01-20 PROCEDURE — 25000242 PHARM REV CODE 250 ALT 637 W/ HCPCS: Performed by: THORACIC SURGERY (CARDIOTHORACIC VASCULAR SURGERY)

## 2018-01-20 PROCEDURE — G8978 MOBILITY CURRENT STATUS: HCPCS | Mod: CL

## 2018-01-20 PROCEDURE — 94799 UNLISTED PULMONARY SVC/PX: CPT

## 2018-01-20 PROCEDURE — 63600175 PHARM REV CODE 636 W HCPCS: Performed by: THORACIC SURGERY (CARDIOTHORACIC VASCULAR SURGERY)

## 2018-01-20 PROCEDURE — G8979 MOBILITY GOAL STATUS: HCPCS | Mod: CJ

## 2018-01-20 PROCEDURE — 94640 AIRWAY INHALATION TREATMENT: CPT

## 2018-01-20 PROCEDURE — 27000221 HC OXYGEN, UP TO 24 HOURS

## 2018-01-20 PROCEDURE — 99900035 HC TECH TIME PER 15 MIN (STAT)

## 2018-01-20 RX ADMIN — IPRATROPIUM BROMIDE AND ALBUTEROL SULFATE 3 ML: .5; 3 SOLUTION RESPIRATORY (INHALATION) at 12:01

## 2018-01-20 RX ADMIN — Medication 3 ML: at 06:01

## 2018-01-20 RX ADMIN — IBUPROFEN 400 MG: 400 TABLET, FILM COATED ORAL at 05:01

## 2018-01-20 RX ADMIN — TIZANIDINE 4 MG: 4 TABLET ORAL at 05:01

## 2018-01-20 RX ADMIN — Medication 3 ML: at 02:01

## 2018-01-20 RX ADMIN — PANTOPRAZOLE SODIUM 40 MG: 40 TABLET, DELAYED RELEASE ORAL at 05:01

## 2018-01-20 RX ADMIN — ATORVASTATIN CALCIUM 40 MG: 20 TABLET, FILM COATED ORAL at 09:01

## 2018-01-20 RX ADMIN — OXYCODONE HYDROCHLORIDE 15 MG: 5 TABLET ORAL at 05:01

## 2018-01-20 RX ADMIN — Medication 3 ML: at 10:01

## 2018-01-20 RX ADMIN — ENOXAPARIN SODIUM 40 MG: 100 INJECTION SUBCUTANEOUS at 08:01

## 2018-01-20 RX ADMIN — LIDOCAINE 1 PATCH: 50 PATCH TOPICAL at 02:01

## 2018-01-20 RX ADMIN — IPRATROPIUM BROMIDE AND ALBUTEROL SULFATE 3 ML: .5; 3 SOLUTION RESPIRATORY (INHALATION) at 08:01

## 2018-01-20 RX ADMIN — ACETAMINOPHEN 1000 MG: 500 TABLET ORAL at 02:01

## 2018-01-20 RX ADMIN — IBUPROFEN 400 MG: 400 TABLET, FILM COATED ORAL at 02:01

## 2018-01-20 RX ADMIN — POLYETHYLENE GLYCOL 3350 17 G: 17 POWDER, FOR SOLUTION ORAL at 08:01

## 2018-01-20 RX ADMIN — ESCITALOPRAM 20 MG: 20 TABLET, FILM COATED ORAL at 09:01

## 2018-01-20 RX ADMIN — TIZANIDINE 4 MG: 4 TABLET ORAL at 02:01

## 2018-01-20 RX ADMIN — ACETAMINOPHEN 1000 MG: 500 TABLET ORAL at 05:01

## 2018-01-20 RX ADMIN — LEVOTHYROXINE SODIUM 88 MCG: 88 TABLET ORAL at 05:01

## 2018-01-20 RX ADMIN — ACETAMINOPHEN 1000 MG: 500 TABLET ORAL at 09:01

## 2018-01-20 RX ADMIN — IPRATROPIUM BROMIDE AND ALBUTEROL SULFATE 3 ML: .5; 3 SOLUTION RESPIRATORY (INHALATION) at 07:01

## 2018-01-20 RX ADMIN — VITAMIN D, TAB 1000IU (100/BT) 2000 UNITS: 25 TAB at 08:01

## 2018-01-20 RX ADMIN — CEFAZOLIN SODIUM 2 G: 2 SOLUTION INTRAVENOUS at 08:01

## 2018-01-20 RX ADMIN — OXYBUTYNIN CHLORIDE 10 MG: 5 TABLET, EXTENDED RELEASE ORAL at 09:01

## 2018-01-20 RX ADMIN — NICOTINE 1 PATCH: 14 PATCH, EXTENDED RELEASE TRANSDERMAL at 08:01

## 2018-01-20 RX ADMIN — TIZANIDINE 4 MG: 4 TABLET ORAL at 09:01

## 2018-01-20 RX ADMIN — OXYCODONE HYDROCHLORIDE 15 MG: 5 TABLET ORAL at 02:01

## 2018-01-20 RX ADMIN — IBUPROFEN 400 MG: 400 TABLET, FILM COATED ORAL at 06:01

## 2018-01-20 NOTE — PROGRESS NOTES
Pimentel catheter removed. 200cc of clear yellow urine discarded. Instructed to notify staff when urge to urinate. Verbalized understanding. Tolerated well.

## 2018-01-20 NOTE — PROGRESS NOTES
Dr Noonan notified about Low BP and Bladder scan results. If Pt's doesn't void by 8 pm insert a spivey catheter.

## 2018-01-20 NOTE — PROGRESS NOTES
[facility]  Thoracic Surgery  Progress Note    Subjective:     History of Present Illness:  No notes on file    Post-Op Info:  Procedure(s) (LRB):  THORACOSCOPY-VIDEO ASSISTED (VATS) W/ LOBECTOMY (Right)  DISSECTION-LYMPH NODE (Right)  BRONCHOSCOPY-OPERATIVE,FLEXIBLE (N/A)  MEDIASTINOSCOPY (N/A)   1 Day Post-Op     Interval History: No acute events overnight. CT output 65ml. ON 2L nc this AM. Pain well controlled, working with IS.    Medications:  Continuous Infusions:  Scheduled Meds:   acetaminophen  1,000 mg Oral Q8H    albuterol-ipratropium 2.5mg-0.5mg/3mL  3 mL Nebulization Q4H WAKE    atorvastatin  40 mg Oral QHS    ceFAZolin (ANCEF) IVPB  2 g Intravenous Q8H    enoxparin  40 mg Subcutaneous Q24H    escitalopram oxalate  20 mg Oral QHS    ibuprofen  400 mg Oral Q6H    levothyroxine  88 mcg Oral Before breakfast    lidocaine  1 patch Transdermal Q24H    losartan  50 mg Oral QHS    nicotine  1 patch Transdermal Daily    oxybutynin  10 mg Oral Nightly    pantoprazole  40 mg Oral Before breakfast    polyethylene glycol  17 g Oral Daily    sodium chloride 0.9%  3 mL Intravenous Q8H    tiZANidine  4 mg Oral Q8H    vitamin D  2,000 Units Oral Daily     PRN Meds:aluminum-magnesium hydroxide-simethicone, bisacodyl, calcium carbonate, lactulose, metoclopramide HCl, ondansetron, oxyCODONE, oxyCODONE     Review of patient's allergies indicates:  No Known Allergies  Objective:     Vital Signs (Most Recent):  Temp: 99.1 °F (37.3 °C) (01/20/18 0800)  Pulse: 80 (01/20/18 0842)  Resp: 16 (01/20/18 0842)  BP: (!) 81/53 (01/20/18 0800)  SpO2: 95 % (01/20/18 0800) Vital Signs (24h Range):  Temp:  [96.9 °F (36.1 °C)-99.2 °F (37.3 °C)] 99.1 °F (37.3 °C)  Pulse:  [] 80  Resp:  [10-20] 16  SpO2:  [88 %-100 %] 95 %  BP: ()/(53-79) 81/53     Intake/Output - Last 3 Shifts       01/18 0700 - 01/19 0659 01/19 0700 - 01/20 0659 01/20 0700 - 01/21 0659    P.O.  150     I.V. (mL/kg) 100 (1.4)  2000 (27.5)     IV Piggyback  50     Total Intake(mL/kg) 100 (1.4) 2200 (30.3)     Urine (mL/kg/hr)  500 (0.3)     Emesis/NG output  0 (0)     Stool  0 (0)     Chest Tube  65 (0)     Total Output   565      Net +100 +1635             Stool Occurrence  0 x     Emesis Occurrence  0 x           SpO2: 95 %  O2 Device (Oxygen Therapy): nasal cannula    Physical Exam   Constitutional: She is oriented to person, place, and time. She appears well-developed and well-nourished. No distress.   HENT:   Head: Normocephalic and atraumatic.   Eyes: Conjunctivae are normal.   Neck: Normal range of motion. Neck supple.   Mediastinal incision CDI no swelling   Cardiovascular: Normal rate, regular rhythm and intact distal pulses.    Pulmonary/Chest: Effort normal. No respiratory distress.   CT on Right w/ low serosanguinous output, no air leak.   To Water seal   Abdominal: Soft. She exhibits no distension. There is no tenderness.   Musculoskeletal: Normal range of motion. She exhibits no edema or deformity.   Neurological: She is alert and oriented to person, place, and time. No cranial nerve deficit. Coordination normal.   Skin: Skin is warm and dry. She is not diaphoretic.   Nursing note and vitals reviewed.      Significant Labs:  BMP: No results for input(s): GLU, NA, K, CL, CO2, BUN, CREATININE, CALCIUM, MG in the last 48 hours.  CBC: No results for input(s): WBC, RBC, HGB, HCT, PLT, MCV, MCH, MCHC in the last 48 hours.    Significant Diagnostics:  CXR: I have reviewed all pertinent results/findings within the past 24 hours    VTE Risk Mitigation         Ordered     enoxaparin injection 40 mg  Every 24 hours (non-standard times)     Route:  Subcutaneous        01/19/18 1210     Medium Risk of VTE  Once      01/19/18 1210        Assessment/Plan:     * Adenocarcinoma, lung    71 y/o F POD 1 Right lower lobectomy and mediastinoscopy.    Oral pain medications oxy PRN, scheduled tylenol, ibuprofen tizanidine   CT to [facility] this morning,  low output, no air leak , after ambulation will D/C CT  Reg diet   DVT/GI ppx  Home meds  Regular diet              Adam Noonan MD  Thoracic Surgery  [facility]

## 2018-01-20 NOTE — PLAN OF CARE
Problem: Physical Therapy Goal  Goal: Physical Therapy Goal  Goals to be met by: 2/3/2018     Patient will increase functional independence with mobility by performin. Supine to sit with Contact Guard Assistance  2. Sit to supine with Contact Guard Assistance  3. Sit to stand transfer with Stand-by Assistance  4. Bed to chair transfer with Stand-by Assistance using Rolling Walker  5. Gait  x 150 feet with Stand-by Assistance using Rolling Walker.   6. Ascend/descend 5 stair with bilateral Handrails Contact Guard Assistance.  7. Lower extremity exercise program x 15 reps per handout, with supervision     Chhaya Sharp is a 72 y.o. female admitted with a medical diagnosis of Adenocarcinoma, lung.  She presents with the following impairments/functional limitations:  weakness, impaired endurance, impaired functional mobilty, gait instability, impaired balance, pain, impaired cardiopulmonary response to activity. Pt at overall Min/Mod A for transfers and mobility. Pt with blood pressure and breathing concerns. RN aware and present for most of evaluation. PT performed orthostatic assessment. In supine, pt's /53; HR 84. In sitting after 2 min, BP increased to 125/60 with symptoms of dizziness of 8/10. Pt with improved dizziness with sitting, but increasing shortness of breath. Pt's BP in standing decreased to 105/57 with increase in dizziness to 10/10 and diaphoresis. Pt seated immediately. O2 increased to 4L due to increasing shortness of breath.  Pt regained breathing and dizziness lessening. Pt returned to chair position in bed. RN took BP again (124/65). RN to notify MD of safety concerns with getting patient to chair, as well as, continued evaluation with therapy. Pt left with RN in no apparent distress and all needs met. Pt will continue to benefit from skilled PT services during acute stay. PT recommends skilled placement at this time with DME to be determined pending better history intact from  patient.

## 2018-01-20 NOTE — PROGRESS NOTES
Pt's BP 81/56, pt asymptomatic; denies dizziness or any other symptoms. Dr Adam Noonan notified; call MD if any changes. No new orders give.

## 2018-01-20 NOTE — PLAN OF CARE
Problem: Patient Care Overview  Goal: Plan of Care Review  Outcome: Revised  POC reviewed with patient who verbalized understanding. VSS. AAOX4. Remains free of falls and injury. RT chest tube intact and patent to water seal. Changed dressing on chest tube due to saturation with gauze and tegaderm. Pimentel intact and patent. Tolerating clear liquid diet, denies nausea. Pain controlled with PRN medications per MAR. Telemetry being monitored running NSR, with occasional tachycardia. Educated on IS use. Patient denies chest pain & SOB. TEDS and SCDS in place. No acute events. No distress noted. Bed in lowest position, call light within reach, frequent rounds made for safety.

## 2018-01-20 NOTE — SUBJECTIVE & OBJECTIVE
Interval History: No acute events overnight. CT output 65ml. ON 2L nc this AM. Pain well controlled, working with IS.    Medications:  Continuous Infusions:  Scheduled Meds:   acetaminophen  1,000 mg Oral Q8H    albuterol-ipratropium 2.5mg-0.5mg/3mL  3 mL Nebulization Q4H WAKE    atorvastatin  40 mg Oral QHS    ceFAZolin (ANCEF) IVPB  2 g Intravenous Q8H    enoxparin  40 mg Subcutaneous Q24H    escitalopram oxalate  20 mg Oral QHS    ibuprofen  400 mg Oral Q6H    levothyroxine  88 mcg Oral Before breakfast    lidocaine  1 patch Transdermal Q24H    losartan  50 mg Oral QHS    nicotine  1 patch Transdermal Daily    oxybutynin  10 mg Oral Nightly    pantoprazole  40 mg Oral Before breakfast    polyethylene glycol  17 g Oral Daily    sodium chloride 0.9%  3 mL Intravenous Q8H    tiZANidine  4 mg Oral Q8H    vitamin D  2,000 Units Oral Daily     PRN Meds:aluminum-magnesium hydroxide-simethicone, bisacodyl, calcium carbonate, lactulose, metoclopramide HCl, ondansetron, oxyCODONE, oxyCODONE     Review of patient's allergies indicates:  No Known Allergies  Objective:     Vital Signs (Most Recent):  Temp: 99.1 °F (37.3 °C) (01/20/18 0800)  Pulse: 80 (01/20/18 0842)  Resp: 16 (01/20/18 0842)  BP: (!) 81/53 (01/20/18 0800)  SpO2: 95 % (01/20/18 0800) Vital Signs (24h Range):  Temp:  [96.9 °F (36.1 °C)-99.2 °F (37.3 °C)] 99.1 °F (37.3 °C)  Pulse:  [] 80  Resp:  [10-20] 16  SpO2:  [88 %-100 %] 95 %  BP: ()/(53-79) 81/53     Intake/Output - Last 3 Shifts       01/18 0700 - 01/19 0659 01/19 0700 - 01/20 0659 01/20 0700 - 01/21 0659    P.O.  150     I.V. (mL/kg) 100 (1.4) 2000 (27.5)     IV Piggyback  50     Total Intake(mL/kg) 100 (1.4) 2200 (30.3)     Urine (mL/kg/hr)  500 (0.3)     Emesis/NG output  0 (0)     Stool  0 (0)     Chest Tube  65 (0)     Total Output   565      Net +100 +1635             Stool Occurrence  0 x     Emesis Occurrence  0 x           SpO2: 95 %  O2 Device (Oxygen Therapy):  nasal cannula    Physical Exam   Constitutional: She is oriented to person, place, and time. She appears well-developed and well-nourished. No distress.   HENT:   Head: Normocephalic and atraumatic.   Eyes: Conjunctivae are normal.   Neck: Normal range of motion. Neck supple.   Mediastinal incision CDI no swelling   Cardiovascular: Normal rate, regular rhythm and intact distal pulses.    Pulmonary/Chest: Effort normal. No respiratory distress.   CT on Right w/ low serosanguinous output, no air leak.   To Water seal   Abdominal: Soft. She exhibits no distension. There is no tenderness.   Musculoskeletal: Normal range of motion. She exhibits no edema or deformity.   Neurological: She is alert and oriented to person, place, and time. No cranial nerve deficit. Coordination normal.   Skin: Skin is warm and dry. She is not diaphoretic.   Nursing note and vitals reviewed.      Significant Labs:  BMP: No results for input(s): GLU, NA, K, CL, CO2, BUN, CREATININE, CALCIUM, MG in the last 48 hours.  CBC: No results for input(s): WBC, RBC, HGB, HCT, PLT, MCV, MCH, MCHC in the last 48 hours.    Significant Diagnostics:  CXR: I have reviewed all pertinent results/findings within the past 24 hours    VTE Risk Mitigation         Ordered     enoxaparin injection 40 mg  Every 24 hours (non-standard times)     Route:  Subcutaneous        01/19/18 1210     Medium Risk of VTE  Once      01/19/18 1210

## 2018-01-20 NOTE — ASSESSMENT & PLAN NOTE
73 y/o F POD 1 Right lower lobectomy and mediastinoscopy.    Oral pain medications oxy PRN, scheduled tylenol, ibuprofen tizanidine   CT to [facility] this morning, low output, no air leak , after ambulation will D/C CT  Reg diet   DVT/GI ppx  Home meds  Regular diet

## 2018-01-20 NOTE — PT/OT/SLP EVAL
Physical Therapy Evaluation    Patient Name:  Chhaya Sharp   MRN:  [id number]    Recommendations:     Discharge Recommendations:  nursing facility, skilled   Discharge Equipment Recommendations:  (will determine pending ability to get clear history at next session)   Barriers to discharge: Inaccessible home and Decreased caregiver support    Assessment:     Chhaya Sharp is a 72 y.o. female admitted with a medical diagnosis of Adenocarcinoma, lung.  She presents with the following impairments/functional limitations:  weakness, impaired endurance, impaired functional mobilty, gait instability, impaired balance, pain, impaired cardiopulmonary response to activity. Pt at overall Min/Mod A for transfers and mobility. Pt with blood pressure and breathing concerns. RN aware and present for most of evaluation. PT performed orthostatic assessment. In supine, pt's /53; HR 84. In sitting after 2 min, BP increased to 125/60 with symptoms of dizziness of 8/10. Pt with improved dizziness with sitting, but increasing shortness of breath. Pt's BP in standing decreased to 105/57 with increase in dizziness to 10/10 and diaphoresis. Pt seated immediately. O2 increased to 4L due to increasing shortness of breath.  Pt regained breathing and dizziness lessening. Pt returned to chair position in bed. RN took BP again (124/65). RN to notify MD of safety concerns with getting patient to chair, as well as, continued evaluation with therapy. Pt left with RN in no apparent distress and all needs met. Pt will continue to benefit from skilled PT services during acute stay. PT recommends skilled placement at this time with DME to be determined pending better history intact from patient.     Rehab Prognosis:  GOOD; patient would benefit from acute skilled PT services to address these deficits and reach maximum level of function.      Recent Surgery: Procedure(s) (LRB):  THORACOSCOPY-VIDEO ASSISTED (VATS) W/ LOBECTOMY  (Right)  DISSECTION-LYMPH NODE (Right)  BRONCHOSCOPY-OPERATIVE,FLEXIBLE (N/A)  MEDIASTINOSCOPY (N/A) 1 Day Post-Op    Plan:     During this hospitalization, patient to be seen 4 x/week to address the above listed problems via gait training, therapeutic activities, therapeutic exercises  · Plan of Care Expires:  02/19/18   Plan of Care Reviewed with: patient, daughter    Subjective     Communicated with RN prior to session.  Patient found in bed HOB elevated upon PT entry to room, agreeable to evaluation.      Chief Complaint: lethargy  Patient comments/goals: get home  Pain/Comfort:  · Pain Rating 1: 5/10  · Location - Side 1: Right  · Location - Orientation 1: generalized  · Location 1: shoulder  · Pain Addressed 1: Pre-medicate for activity, Cessation of Activity, Nurse notified  · Pain Rating Post-Intervention 1: 5/10    Patients cultural, spiritual, Evangelical conflicts given the current situation: none noted    Living Environment:  No history obtained from patient due to blood pressure and shortness of breath concerns; to be obtained at next session.     Prior to admission, patients level of function was independent.  Patient has the following equipment:  (unable to gather full history due to pt's dizziness and SOB).  DME owned (not currently used): none.  Upon discharge, patient will have assistance from daughter.    Objective:     Patient found with: chest tube, peripheral IV, telemetry, oxygen     General Precautions: Standard, fall   Orthopedic Precautions:N/A   Braces: N/A     Exams:  · Cognitive Exam:  Patient is oriented to Person, Place, Time and Situation and follows 100% of 25 commands   · RLE Strength: WFL  · LLE Strength: WFL    Functional Mobility:  · Bed Mobility:     · Supine to Sit: minimum assistance  · Sit to Supine: moderate assistance  · Transfers:     · Sit to Stand:  minimum assistance and of 2 persons with no AD  · Gait: Pt ambulated 3 sidesteps at EOB with Min A x 2 persons for safety  and balance.     AM-PAC 6 CLICK MOBILITY  Total Score:11     Patient left with bed in chair position with all lines intact, call button in reach, RN notified and RN and family present.    GOALS:    Physical Therapy Goals        Problem: Physical Therapy Goal    Goal Priority Disciplines Outcome Goal Variances Interventions   Physical Therapy Goal     PT/OT, PT      Description:  Goals to be met by: 2/3/2018     Patient will increase functional independence with mobility by performin. Supine to sit with Contact Guard Assistance  2. Sit to supine with Contact Guard Assistance  3. Sit to stand transfer with Stand-by Assistance  4. Bed to chair transfer with Stand-by Assistance using Rolling Walker  5. Gait  x 150 feet with Stand-by Assistance using Rolling Walker.   6. Ascend/descend 5 stair with bilateral Handrails Contact Guard Assistance.  7. Lower extremity exercise program x 15 reps per handout, with supervision                      History:     Past Medical History:   Diagnosis Date    COPD (chronic obstructive pulmonary disease)     Hyperlipidemia     Hypertension     Hypothyroidism        Past Surgical History:   Procedure Laterality Date    JOINT REPLACEMENT      SINUS SURGERY      SPINE SURGERY      back, neck       Clinical Decision Making:     History  Co-morbidities and personal factors that may impact the plan of care Examination  Body Structures and Functions, activity limitations and participation restrictions that may impact the plan of care Clinical Presentation   Decision Making/ Complexity Score   Co-morbidities:   [] Time since onset of injury / illness / exacerbation  [x] Status of current condition  []Patient's cognitive status and safety concerns    [] Multiple Medical Problems (see med hx)  Personal Factors:   [x] Patient's age  [] Prior Level of function   [] Patient's home situation (environment and family support)  [] Patient's level of motivation  [] Expected progression of  patient      HISTORY:(criteria)    [] 31694 - no personal factors/history    [x] 34980 - has 1-2 personal factor/comorbidity     [] 98949 - has >3 personal factor/comorbidity     Body Regions:  [] Objective examination findings  [] Head     []  Neck  [] Trunk   [] Upper Extremity  [x] Lower Extremity    Body Systems:  [] For communication ability, affect, cognition, language, and learning style: the assessment of the ability to make needs known, consciousness, orientation (person, place, and time), expected emotional /behavioral responses, and learning preferences (eg, learning barriers, education  needs)  [] For the neuromuscular system: a general assessment of gross coordinated movement (eg, balance, gait, locomotion, transfers, and transitions) and motor function  (motor control and motor learning)  [x] For the musculoskeletal system: the assessment of gross symmetry, gross range of motion, gross strength, height, and weight  [] For the integumentary system: the assessment of pliability(texture), presence of scar formation, skin color, and skin integrity  [x] For cardiovascular/pulmonary system: the assessment of heart rate, respiratory rate, blood pressure, and edema     Activity limitations:    [] Patient's cognitive status and saf ety concerns          [x] Status of current condition      [] Weight bearing restriction  [] Cardiopulmunary Restriction    Participation Restrictions:   [] Goals and goal agreement with the patient     [] Rehab potential (prognosis) and probable outcome      Examination of Body System: (criteria)    [] 07379 - addressing 1-2 elements    [x] 32547 - addressing a total of 3 or more elements     [] 50486 -  Addressing a total of 4 or more elements         Clinical Presentation: (criteria)  Evolving - 46196     On examination of body system using standardized tests and measures patient presents with 3 or more elements from any of the following: body structures and functions, activity  limitations, and/or participation restrictions.  Leading to a clinical presentation that is considered evolving with changing characteristics                              Clinical Decision Making  (Eval Complexity):  Moderate - 65153     Time Tracking:     PT Received On: 01/20/18  PT Start Time: 1100     PT Stop Time: 1125  PT Total Time (min): 25 min     Billable Minutes: Evaluation 1 procedure      Radha Bustos, PT  01/20/2018

## 2018-01-21 PROCEDURE — S4991 NICOTINE PATCH NONLEGEND: HCPCS | Performed by: THORACIC SURGERY (CARDIOTHORACIC VASCULAR SURGERY)

## 2018-01-21 PROCEDURE — 94761 N-INVAS EAR/PLS OXIMETRY MLT: CPT

## 2018-01-21 PROCEDURE — 97116 GAIT TRAINING THERAPY: CPT

## 2018-01-21 PROCEDURE — 27000221 HC OXYGEN, UP TO 24 HOURS

## 2018-01-21 PROCEDURE — 25000003 PHARM REV CODE 250: Performed by: THORACIC SURGERY (CARDIOTHORACIC VASCULAR SURGERY)

## 2018-01-21 PROCEDURE — 99900035 HC TECH TIME PER 15 MIN (STAT)

## 2018-01-21 PROCEDURE — 63600175 PHARM REV CODE 636 W HCPCS: Performed by: THORACIC SURGERY (CARDIOTHORACIC VASCULAR SURGERY)

## 2018-01-21 PROCEDURE — 25000003 PHARM REV CODE 250: Performed by: PHYSICIAN ASSISTANT

## 2018-01-21 PROCEDURE — 20600001 HC STEP DOWN PRIVATE ROOM

## 2018-01-21 PROCEDURE — 94640 AIRWAY INHALATION TREATMENT: CPT

## 2018-01-21 PROCEDURE — 97530 THERAPEUTIC ACTIVITIES: CPT

## 2018-01-21 PROCEDURE — A4216 STERILE WATER/SALINE, 10 ML: HCPCS | Performed by: THORACIC SURGERY (CARDIOTHORACIC VASCULAR SURGERY)

## 2018-01-21 PROCEDURE — 97110 THERAPEUTIC EXERCISES: CPT

## 2018-01-21 PROCEDURE — 25000242 PHARM REV CODE 250 ALT 637 W/ HCPCS: Performed by: THORACIC SURGERY (CARDIOTHORACIC VASCULAR SURGERY)

## 2018-01-21 RX ADMIN — IPRATROPIUM BROMIDE AND ALBUTEROL SULFATE 3 ML: .5; 3 SOLUTION RESPIRATORY (INHALATION) at 12:01

## 2018-01-21 RX ADMIN — ACETAMINOPHEN 1000 MG: 500 TABLET ORAL at 03:01

## 2018-01-21 RX ADMIN — OXYCODONE HYDROCHLORIDE 15 MG: 5 TABLET ORAL at 06:01

## 2018-01-21 RX ADMIN — IBUPROFEN 400 MG: 400 TABLET, FILM COATED ORAL at 05:01

## 2018-01-21 RX ADMIN — OXYBUTYNIN CHLORIDE 10 MG: 5 TABLET, EXTENDED RELEASE ORAL at 09:01

## 2018-01-21 RX ADMIN — Medication 3 ML: at 10:01

## 2018-01-21 RX ADMIN — IPRATROPIUM BROMIDE AND ALBUTEROL SULFATE 3 ML: .5; 3 SOLUTION RESPIRATORY (INHALATION) at 03:01

## 2018-01-21 RX ADMIN — Medication 3 ML: at 02:01

## 2018-01-21 RX ADMIN — IBUPROFEN 400 MG: 400 TABLET, FILM COATED ORAL at 11:01

## 2018-01-21 RX ADMIN — VITAMIN D, TAB 1000IU (100/BT) 2000 UNITS: 25 TAB at 09:01

## 2018-01-21 RX ADMIN — OXYCODONE HYDROCHLORIDE 15 MG: 5 TABLET ORAL at 03:01

## 2018-01-21 RX ADMIN — PANTOPRAZOLE SODIUM 40 MG: 40 TABLET, DELAYED RELEASE ORAL at 05:01

## 2018-01-21 RX ADMIN — ACETAMINOPHEN 1000 MG: 500 TABLET ORAL at 09:01

## 2018-01-21 RX ADMIN — IPRATROPIUM BROMIDE AND ALBUTEROL SULFATE 3 ML: .5; 3 SOLUTION RESPIRATORY (INHALATION) at 08:01

## 2018-01-21 RX ADMIN — NICOTINE 1 PATCH: 14 PATCH, EXTENDED RELEASE TRANSDERMAL at 09:01

## 2018-01-21 RX ADMIN — IBUPROFEN 400 MG: 400 TABLET, FILM COATED ORAL at 06:01

## 2018-01-21 RX ADMIN — TIZANIDINE 4 MG: 4 TABLET ORAL at 09:01

## 2018-01-21 RX ADMIN — POLYETHYLENE GLYCOL 3350 17 G: 17 POWDER, FOR SOLUTION ORAL at 09:01

## 2018-01-21 RX ADMIN — OXYCODONE HYDROCHLORIDE 10 MG: 5 TABLET ORAL at 07:01

## 2018-01-21 RX ADMIN — ENOXAPARIN SODIUM 40 MG: 100 INJECTION SUBCUTANEOUS at 09:01

## 2018-01-21 RX ADMIN — TIZANIDINE 4 MG: 4 TABLET ORAL at 03:01

## 2018-01-21 RX ADMIN — LIDOCAINE 1 PATCH: 50 PATCH TOPICAL at 03:01

## 2018-01-21 RX ADMIN — OXYCODONE HYDROCHLORIDE 10 MG: 5 TABLET ORAL at 11:01

## 2018-01-21 RX ADMIN — ATORVASTATIN CALCIUM 40 MG: 20 TABLET, FILM COATED ORAL at 09:01

## 2018-01-21 RX ADMIN — IBUPROFEN 400 MG: 400 TABLET, FILM COATED ORAL at 12:01

## 2018-01-21 RX ADMIN — LEVOTHYROXINE SODIUM 88 MCG: 88 TABLET ORAL at 05:01

## 2018-01-21 RX ADMIN — TIZANIDINE 4 MG: 4 TABLET ORAL at 05:01

## 2018-01-21 RX ADMIN — ESCITALOPRAM 20 MG: 20 TABLET, FILM COATED ORAL at 09:01

## 2018-01-21 NOTE — PROGRESS NOTES
[facility]  Thoracic Surgery  Progress Note    Subjective:     History of Present Illness:  No notes on file    Post-Op Info:  Procedure(s) (LRB):  THORACOSCOPY-VIDEO ASSISTED (VATS) W/ LOBECTOMY (Right)  DISSECTION-LYMPH NODE (Right)  BRONCHOSCOPY-OPERATIVE,FLEXIBLE (N/A)  MEDIASTINOSCOPY (N/A)   2 Days Post-Op     Interval History: Pimentel replaced overnight . CT removed yesterday. ON 2L nc this AM. Pain well controlled, working with IS, up to chair yesterday.     Medications:  Continuous Infusions:  Scheduled Meds:   acetaminophen  1,000 mg Oral Q8H    albuterol-ipratropium 2.5mg-0.5mg/3mL  3 mL Nebulization Q4H WAKE    atorvastatin  40 mg Oral QHS    enoxparin  40 mg Subcutaneous Q24H    escitalopram oxalate  20 mg Oral QHS    ibuprofen  400 mg Oral Q6H    levothyroxine  88 mcg Oral Before breakfast    lidocaine  1 patch Transdermal Q24H    nicotine  1 patch Transdermal Daily    oxybutynin  10 mg Oral Nightly    pantoprazole  40 mg Oral Before breakfast    polyethylene glycol  17 g Oral Daily    sodium chloride 0.9%  3 mL Intravenous Q8H    tiZANidine  4 mg Oral Q8H    vitamin D  2,000 Units Oral Daily     PRN Meds:aluminum-magnesium hydroxide-simethicone, bisacodyl, calcium carbonate, lactulose, metoclopramide HCl, ondansetron, oxyCODONE, oxyCODONE     Review of patient's allergies indicates:  No Known Allergies  Objective:     Vital Signs (Most Recent):  Temp: 97.6 °F (36.4 °C) (01/21/18 0814)  Pulse: 85 (01/21/18 0852)  Resp: 18 (01/21/18 0852)  BP: (!) 108/55 (01/21/18 0814)  SpO2: (!) 92 % (01/21/18 0852) Vital Signs (24h Range):  Temp:  [96.2 °F (35.7 °C)-97.8 °F (36.6 °C)] 97.6 °F (36.4 °C)  Pulse:  [71-88] 85  Resp:  [16-20] 18  SpO2:  [84 %-99 %] 92 %  BP: ()/(45-70) 108/55     Intake/Output - Last 3 Shifts       01/19 0700 - 01/20 0659 01/20 0700 - 01/21 0659 01/21 0700 - 01/22 0659    P.O. 150 760     I.V. (mL/kg) 2000 (27.5)      IV Piggyback 50      Total  Intake(mL/kg) 2200 (30.3) 760 (10.5)     Urine (mL/kg/hr) 500 (0.3) 1000 (0.6)     Emesis/NG output 0 (0)      Stool 0 (0)      Chest Tube 65 (0) 145 (0.1)     Total Output 565 1145      Net +1635 -385             Stool Occurrence 0 x      Emesis Occurrence 0 x            SpO2: (!) 92 %  O2 Device (Oxygen Therapy): nasal cannula    Physical Exam   Constitutional: She is oriented to person, place, and time. She appears well-developed and well-nourished. No distress.   HENT:   Head: Normocephalic and atraumatic.   Eyes: Conjunctivae are normal.   Neck: Normal range of motion. Neck supple.   Mediastinal incision CDI no swelling   Cardiovascular: Normal rate, regular rhythm and intact distal pulses.    Pulmonary/Chest: Effort normal. No respiratory distress.   Incisions on Right from VATS CDI    Abdominal: Soft. She exhibits no distension. There is no tenderness.   Musculoskeletal: Normal range of motion. She exhibits no edema or deformity.   Neurological: She is alert and oriented to person, place, and time. No cranial nerve deficit. Coordination normal.   Skin: Skin is warm and dry. She is not diaphoretic.   Nursing note and vitals reviewed.      Significant Labs:  BMP: No results for input(s): GLU, NA, K, CL, CO2, BUN, CREATININE, CALCIUM, MG in the last 48 hours.  CBC:     Recent Labs  Lab 01/20/18  0915   WBC 13.73*   RBC 3.88*   HGB 11.6*   HCT 36.5*      MCV 94   MCH 29.9   MCHC 31.8*       Significant Diagnostics:  CXR: I have reviewed all pertinent results/findings within the past 24 hours    VTE Risk Mitigation         Ordered     enoxaparin injection 40 mg  Every 24 hours (non-standard times)     Route:  Subcutaneous        01/19/18 1210     Medium Risk of VTE  Once      01/19/18 1210        Assessment/Plan:     * Adenocarcinoma, lung    73 y/o F 2 Days Post-Op Right lower lobectomy and mediastinoscopy.    Oral pain medications oxy PRN, scheduled tylenol, ibuprofen tizanidine   CT D/C'd yesterday    Margarito replaced yesterday, plan to D/C at midnight tonight  OOB today, ambulate with nursing or PT  Reg diet   DVT/GI ppx  Home meds  Regular diet              Adam Noonan MD  Thoracic Surgery  [facility]

## 2018-01-21 NOTE — SUBJECTIVE & OBJECTIVE
Interval History: Pimentel replaced overnight . CT removed yesterday. ON 2L nc this AM. Pain well controlled, working with IS, up to chair yesterday.     Medications:  Continuous Infusions:  Scheduled Meds:   acetaminophen  1,000 mg Oral Q8H    albuterol-ipratropium 2.5mg-0.5mg/3mL  3 mL Nebulization Q4H WAKE    atorvastatin  40 mg Oral QHS    enoxparin  40 mg Subcutaneous Q24H    escitalopram oxalate  20 mg Oral QHS    ibuprofen  400 mg Oral Q6H    levothyroxine  88 mcg Oral Before breakfast    lidocaine  1 patch Transdermal Q24H    nicotine  1 patch Transdermal Daily    oxybutynin  10 mg Oral Nightly    pantoprazole  40 mg Oral Before breakfast    polyethylene glycol  17 g Oral Daily    sodium chloride 0.9%  3 mL Intravenous Q8H    tiZANidine  4 mg Oral Q8H    vitamin D  2,000 Units Oral Daily     PRN Meds:aluminum-magnesium hydroxide-simethicone, bisacodyl, calcium carbonate, lactulose, metoclopramide HCl, ondansetron, oxyCODONE, oxyCODONE     Review of patient's allergies indicates:  No Known Allergies  Objective:     Vital Signs (Most Recent):  Temp: 97.6 °F (36.4 °C) (01/21/18 0814)  Pulse: 85 (01/21/18 0852)  Resp: 18 (01/21/18 0852)  BP: (!) 108/55 (01/21/18 0814)  SpO2: (!) 92 % (01/21/18 0852) Vital Signs (24h Range):  Temp:  [96.2 °F (35.7 °C)-97.8 °F (36.6 °C)] 97.6 °F (36.4 °C)  Pulse:  [71-88] 85  Resp:  [16-20] 18  SpO2:  [84 %-99 %] 92 %  BP: ()/(45-70) 108/55     Intake/Output - Last 3 Shifts       01/19 0700 - 01/20 0659 01/20 0700 - 01/21 0659 01/21 0700 - 01/22 0659    P.O. 150 760     I.V. (mL/kg) 2000 (27.5)      IV Piggyback 50      Total Intake(mL/kg) 2200 (30.3) 760 (10.5)     Urine (mL/kg/hr) 500 (0.3) 1000 (0.6)     Emesis/NG output 0 (0)      Stool 0 (0)      Chest Tube 65 (0) 145 (0.1)     Total Output 565 1145      Net +1635 -385             Stool Occurrence 0 x      Emesis Occurrence 0 x            SpO2: (!) 92 %  O2 Device (Oxygen Therapy): nasal cannula    Physical  Exam   Constitutional: She is oriented to person, place, and time. She appears well-developed and well-nourished. No distress.   HENT:   Head: Normocephalic and atraumatic.   Eyes: Conjunctivae are normal.   Neck: Normal range of motion. Neck supple.   Mediastinal incision CDI no swelling   Cardiovascular: Normal rate, regular rhythm and intact distal pulses.    Pulmonary/Chest: Effort normal. No respiratory distress.   Incisions on Right from VATS CDI    Abdominal: Soft. She exhibits no distension. There is no tenderness.   Musculoskeletal: Normal range of motion. She exhibits no edema or deformity.   Neurological: She is alert and oriented to person, place, and time. No cranial nerve deficit. Coordination normal.   Skin: Skin is warm and dry. She is not diaphoretic.   Nursing note and vitals reviewed.      Significant Labs:  BMP: No results for input(s): GLU, NA, K, CL, CO2, BUN, CREATININE, CALCIUM, MG in the last 48 hours.  CBC:     Recent Labs  Lab 01/20/18  0915   WBC 13.73*   RBC 3.88*   HGB 11.6*   HCT 36.5*      MCV 94   MCH 29.9   MCHC 31.8*       Significant Diagnostics:  CXR: I have reviewed all pertinent results/findings within the past 24 hours    VTE Risk Mitigation         Ordered     enoxaparin injection 40 mg  Every 24 hours (non-standard times)     Route:  Subcutaneous        01/19/18 1210     Medium Risk of VTE  Once      01/19/18 1210

## 2018-01-21 NOTE — PLAN OF CARE
Problem: Physical Therapy Goal  Goal: Physical Therapy Goal  Goals to be met by: 2/3/2018     Patient will increase functional independence with mobility by performin. Supine to sit with Contact Guard Assistance  2. Sit to supine with Contact Guard Assistance  3. Sit to stand transfer with Stand-by Assistance  4. Bed to chair transfer with Stand-by Assistance using Rolling Walker  5. Gait  x 150 feet with Stand-by Assistance using Rolling Walker.   6. Ascend/descend 5 stair with bilateral Handrails Contact Guard Assistance.  7. Lower extremity exercise program x 15 reps per handout, with supervision       Pt progressing towards goals. continue with PT POC.Goals remain appropriate.  Berny Wallace PTA  2018

## 2018-01-21 NOTE — ASSESSMENT & PLAN NOTE
71 y/o F 2 Days Post-Op Right lower lobectomy and mediastinoscopy.    Oral pain medications oxy PRN, scheduled tylenol, ibuprofen tizanidine   CT D/C'd yesterday   Pimentel replaced yesterday, plan to D/C at midnight tonight  OOB today, ambulate with nursing or PT  Reg diet   DVT/GI ppx  Home meds  Regular diet

## 2018-01-21 NOTE — PLAN OF CARE
Problem: Fall Risk (Adult)  Goal: Identify Related Risk Factors and Signs and Symptoms  Related risk factors and signs and symptoms are identified upon initiation of Human Response Clinical Practice Guideline (CPG)   No falls during this shift.     Problem: Pressure Ulcer Risk (Joseph Scale) (Adult,Obstetrics,Pediatric)  Goal: Identify Related Risk Factors and Signs and Symptoms  Related risk factors and signs and symptoms are identified upon initiation of Human Response Clinical Practice Guideline (CPG)   Pt ambulated during this shift.

## 2018-01-21 NOTE — PT/OT/SLP PROGRESS
Physical Therapy Treatment    Patient Name:  Chhaya Sharp   MRN:  [id number]    Recommendations:     Discharge Recommendations:  nursing facility, skilled   Discharge Equipment Recommendations: walker, rolling   Barriers to discharge: Inaccessible home and Decreased caregiver support    Assessment:     Chhaya Sharp is a 72 y.o. female admitted with a medical diagnosis of Adenocarcinoma, lung.  She presents with the following impairments/functional limitations:  weakness, impaired endurance, impaired self care skills, gait instability, impaired functional mobilty, impaired balance, impaired cardiopulmonary response to activity . Pt Progressing with PT Intervention. Pt Progressing with improving gait distance. Pt limited due to c/o of fatigue and SOB, no c/o of dizziness. Pt would continue to benefit from skilled PT to address overall functional mobility and goals. Goals remain appropriate      Rehab Prognosis:  good; patient would benefit from acute skilled PT services to address these deficits and reach maximum level of function.      Recent Surgery: Procedure(s) (LRB):  THORACOSCOPY-VIDEO ASSISTED (VATS) W/ LOBECTOMY (Right)  DISSECTION-LYMPH NODE (Right)  BRONCHOSCOPY-OPERATIVE,FLEXIBLE (N/A)  MEDIASTINOSCOPY (N/A) 2 Days Post-Op    Plan:     During this hospitalization, patient to be seen 4 x/week to address the above listed problems via gait training, therapeutic activities, therapeutic exercises  · Plan of Care Expires:  02/19/18   Plan of Care Reviewed with: patient, daughter    Subjective     Communicated with RN prior to session.  Patient found good upon PT entry to room, agreeable to treatment.  RN checked BP prior to session WFL    Chief Complaint: fatigue  Pain/Comfort:  · Pain Rating 1: 4/10  · Location - Orientation 1: generalized  · Location 1: shoulder  · Pain Addressed 1: Pre-medicate for activity, Distraction, Cessation of Activity, Nurse notified  · Pain Rating Post-Intervention 1:  4/10    Patients cultural, spiritual, Church conflicts given the current situation: none noted    Objective:     Patient found with: peripheral IV, telemetry, oxygen     General Precautions: Standard, fall   Orthopedic Precautions:N/A   Braces: N/A     Functional Mobility:  § Supine to Sit: minimum assistance  · Transfers:     § Sit to Stand:  minimum assistance with RW  · Gait: Pt ambulated 55 ft with O2 in tow  With RW with CGA/Min A  for safety and balance. Pt required standing rest breaks prn      AM-PAC 6 CLICK MOBILITY  Turning over in bed (including adjusting bedclothes, sheets and blankets)?: 3  Sitting down on and standing up from a chair with arms (e.g., wheelchair, bedside commode, etc.): 3  Moving from lying on back to sitting on the side of the bed?: 3  Moving to and from a bed to a chair (including a wheelchair)?: 2  Need to walk in hospital room?: 2  Climbing 3-5 steps with a railing?: 1  Total Score: 14       Therapeutic Activities and Exercises:   Discussed/educated patient on progress, safety, PT POC   Patient performed therex X 15 reps seated in bedside chair B LE AROM AP, LAQ, Hip Flexion, Hip Abd/Add   White board updated   Donned an extra gown     Patient left up in chair with all lines intact, call button in reach, nsg notified and daughter present..    GOALS:    Physical Therapy Goals        Problem: Physical Therapy Goal    Goal Priority Disciplines Outcome Goal Variances Interventions   Physical Therapy Goal     PT/OT, PT      Description:  Goals to be met by: 2/3/2018     Patient will increase functional independence with mobility by performin. Supine to sit with Contact Guard Assistance  2. Sit to supine with Contact Guard Assistance  3. Sit to stand transfer with Stand-by Assistance  4. Bed to chair transfer with Stand-by Assistance using Rolling Walker  5. Gait  x 150 feet with Stand-by Assistance using Rolling Walker.   6. Ascend/descend 5 stair with bilateral Handrails  Contact Guard Assistance.  7. Lower extremity exercise program x 15 reps per handout, with supervision                      Time Tracking:     PT Received On: 01/21/18  PT Start Time: 0808     PT Stop Time: 0847  PT Total Time (min): 39 min     Billable Minutes: Gait Training 15, Therapeutic Activity 9 and Therapeutic Exercise 15    Treatment Type: Treatment  PT/PTA: EDY Wallace, EDY  01/21/2018

## 2018-01-22 VITALS
WEIGHT: 160 LBS | TEMPERATURE: 99 F | OXYGEN SATURATION: 96 % | DIASTOLIC BLOOD PRESSURE: 75 MMHG | BODY MASS INDEX: 27.31 KG/M2 | HEART RATE: 90 BPM | HEIGHT: 64 IN | SYSTOLIC BLOOD PRESSURE: 158 MMHG | RESPIRATION RATE: 18 BRPM

## 2018-01-22 DIAGNOSIS — C34.90 ADENOCARCINOMA OF LUNG, UNSPECIFIED LATERALITY: Primary | ICD-10-CM

## 2018-01-22 DIAGNOSIS — C34.91 ADENOCARCINOMA OF RIGHT LUNG: Primary | ICD-10-CM

## 2018-01-22 PROCEDURE — 27000221 HC OXYGEN, UP TO 24 HOURS

## 2018-01-22 PROCEDURE — 94640 AIRWAY INHALATION TREATMENT: CPT

## 2018-01-22 PROCEDURE — 63600175 PHARM REV CODE 636 W HCPCS: Performed by: THORACIC SURGERY (CARDIOTHORACIC VASCULAR SURGERY)

## 2018-01-22 PROCEDURE — 25000003 PHARM REV CODE 250: Performed by: THORACIC SURGERY (CARDIOTHORACIC VASCULAR SURGERY)

## 2018-01-22 PROCEDURE — A4216 STERILE WATER/SALINE, 10 ML: HCPCS | Performed by: THORACIC SURGERY (CARDIOTHORACIC VASCULAR SURGERY)

## 2018-01-22 PROCEDURE — 25000242 PHARM REV CODE 250 ALT 637 W/ HCPCS: Performed by: THORACIC SURGERY (CARDIOTHORACIC VASCULAR SURGERY)

## 2018-01-22 PROCEDURE — S4991 NICOTINE PATCH NONLEGEND: HCPCS | Performed by: THORACIC SURGERY (CARDIOTHORACIC VASCULAR SURGERY)

## 2018-01-22 PROCEDURE — 94761 N-INVAS EAR/PLS OXIMETRY MLT: CPT

## 2018-01-22 RX ORDER — HYDROCODONE BITARTRATE AND ACETAMINOPHEN 10; 325 MG/1; MG/1
1 TABLET ORAL EVERY 4 HOURS PRN
Qty: 42 TABLET | Refills: 0 | Status: SHIPPED | OUTPATIENT
Start: 2018-01-22 | End: 2018-01-22

## 2018-01-22 RX ORDER — HYDROCODONE BITARTRATE AND ACETAMINOPHEN 10; 325 MG/1; MG/1
1 TABLET ORAL EVERY 4 HOURS PRN
Qty: 41 TABLET | Refills: 0 | Status: SHIPPED | OUTPATIENT
Start: 2018-01-22

## 2018-01-22 RX ADMIN — TIZANIDINE 4 MG: 4 TABLET ORAL at 05:01

## 2018-01-22 RX ADMIN — POLYETHYLENE GLYCOL 3350 17 G: 17 POWDER, FOR SOLUTION ORAL at 08:01

## 2018-01-22 RX ADMIN — IPRATROPIUM BROMIDE AND ALBUTEROL SULFATE 3 ML: .5; 3 SOLUTION RESPIRATORY (INHALATION) at 02:01

## 2018-01-22 RX ADMIN — IPRATROPIUM BROMIDE AND ALBUTEROL SULFATE 3 ML: .5; 3 SOLUTION RESPIRATORY (INHALATION) at 09:01

## 2018-01-22 RX ADMIN — ACETAMINOPHEN 1000 MG: 500 TABLET ORAL at 05:01

## 2018-01-22 RX ADMIN — IBUPROFEN 400 MG: 400 TABLET, FILM COATED ORAL at 11:01

## 2018-01-22 RX ADMIN — NICOTINE 1 PATCH: 14 PATCH, EXTENDED RELEASE TRANSDERMAL at 08:01

## 2018-01-22 RX ADMIN — LEVOTHYROXINE SODIUM 88 MCG: 88 TABLET ORAL at 05:01

## 2018-01-22 RX ADMIN — ENOXAPARIN SODIUM 40 MG: 100 INJECTION SUBCUTANEOUS at 08:01

## 2018-01-22 RX ADMIN — VITAMIN D, TAB 1000IU (100/BT) 2000 UNITS: 25 TAB at 08:01

## 2018-01-22 RX ADMIN — Medication 3 ML: at 06:01

## 2018-01-22 RX ADMIN — IBUPROFEN 400 MG: 400 TABLET, FILM COATED ORAL at 05:01

## 2018-01-22 RX ADMIN — IPRATROPIUM BROMIDE AND ALBUTEROL SULFATE 3 ML: .5; 3 SOLUTION RESPIRATORY (INHALATION) at 01:01

## 2018-01-22 RX ADMIN — PANTOPRAZOLE SODIUM 40 MG: 40 TABLET, DELAYED RELEASE ORAL at 05:01

## 2018-01-22 NOTE — DISCHARGE SUMMARY
[facility]  Thoracic Surgery  Discharge Summary    Patient Name: Chhaya Sharp  MRN: [id number]  Admission Date: 1/19/2018  Hospital Length of Stay: 3 days  Discharge Date and Time: No discharge date for patient encounter.  Attending Physician: Gregorio Khan MD   Discharging Provider: GO Rosa  Primary Care Provider: HEAVEN Gamboa NP    HPI:    72 y.o. 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 This nodule was found after the patient had a CXR done for pre-op clearance ahead of a knee replacement. CT was done showing the right lower lobe mass as well as a 6.4mm nodule in the right upper lobe that did not light up on PET scan; however there was a 6mm cervical node of unclear etiology that was noted on the PET. She remains active, does her own shopping and housekeeping, but lives with her daughter mainly due to orthopedic issues. She has smoked 2-3 packs per day since she was 15, but recently cut back to 1/2 pack per day. She denies recurrent pneumonia, SOB, cough, bloody sputum, recent weight loss, or fatigue. She mainly endorses joint pain. Patient and daughters state she has already undergone PFTs and Cardiac clearance.     Procedure(s) (LRB):  THORACOSCOPY-VIDEO ASSISTED (VATS) W/ LOBECTOMY (Right)  DISSECTION-LYMPH NODE (Right)  BRONCHOSCOPY-OPERATIVE,FLEXIBLE (N/A)  MEDIASTINOSCOPY (N/A)      Hospital Course: 1/19/18 -1. Diagnostic Flexible Bronchoscopy.  2. Cervical Mediastinoscopy with Biopsy.  3. Right Thoracoscopy with Lower Lobectomy.  4. Right Thoracoscopy with Mediastinal and Regional Lymphadenectomy  1/20/18 - Transitioned to PO pain medications. Regular diet. Working with PT/OT. Chest tube removed.   1/21/18 - Pimentel replaced over night for urinary retention. Pain  well controlled. Weaned from NC O2.   1/22/18 -  Voided after spivey removed. Tolerating diet. Stable for discharge with Home Health Services.         Significant Diagnostic Studies: Labs: BMP: No results for input(s): GLU, NA, K, CL, CO2, BUN, CREATININE, CALCIUM, MG in the last 48 hours., CMP No results for input(s): NA, K, CL, CO2, GLU, BUN, CREATININE, CALCIUM, PROT, ALBUMIN, BILITOT, ALKPHOS, AST, ALT, ANIONGAP, ESTGFRAFRICA, EGFRNONAA in the last 48 hours. and CBC No results for input(s): WBC, HGB, HCT, PLT in the last 48 hours.  Radiology: X-Ray: CXR: X-Ray Chest 1 View (CXR):   Results for orders placed or performed during the hospital encounter of 01/19/18   X-Ray Chest AP Single View    Narrative    Time of Procedure: 01/22/18 05:39:00  Accession # [id number]    Comparison: 1/21/2018, 0632 hrs.  1/20/2018, 1726 hrs.  1/19/2018, 1235 hrs.    Number of views: 1.    Findings:  RIGHT pneumothorax is not apparent to me on the current study.  No other significant change compared to recent examinations in this patient status post recent partial resection of the RIGHT lung.  Bilateral total shoulder arthroplasties again noted, incompletely included on the study.    Impression    No pneumothorax identified in this patient status post partial resection of the RIGHT lung.      Electronically signed by: Ana Lilia Peñaloza MD  Date:     01/22/18  Time:    08:58     and X-Ray Chest PA and Lateral (CXR): No results found for this visit on 01/19/18.    Pending Diagnostic Studies:     None        Final Active Diagnoses:    Diagnosis Date Noted POA    PRINCIPAL PROBLEM:  Adenocarcinoma, lung [C34.90] 01/19/2018 Yes    Depression [F32.9] 01/12/2018 Yes    Chronic, continuous use of opioids [F11.90] 01/12/2018 Yes    Pituitary adenoma [D35.2] 01/12/2018 Yes    Constipation [K59.00] 01/12/2018 Yes    Other emphysema [J43.8] 01/12/2018 Yes    Varicose veins of both lower extremities [I83.93] 01/12/2018 Yes    HTN  (hypertension) [I10] 01/04/2018 Yes    HLD (hyperlipidemia) [E78.5] 01/04/2018 Yes    Current smoker [F17.200] 01/04/2018 Yes      Problems Resolved During this Admission:    Diagnosis Date Noted Date Resolved POA      Discharged Condition: good    Disposition: Home-Health Care Svc    Follow Up:  Follow-up Information     [facility].    Specialty:  Home Health Services  Why:  Home Health: The home health nurse will see the patient tomorrow.  Contact information:  [address] [place] [address] [phone]             Gregorio Khan MD In 2 weeks.    Specialty:  Cardiothoracic Surgery  Contact information:  [address] [facility] [address]  [phone]                 Patient Instructions:     Diet Adult Regular     Activity as tolerated     Shower on day dressing removed (No bath)     Lifting restrictions   Order Comments: No more than 20 pounds for 6 weeks.     Notify your health care provider if you experience any of the following:  temperature >100.4     Notify your health care provider if you experience any of the following:  persistent nausea and vomiting or diarrhea     Notify your health care provider if you experience any of the following:  severe uncontrolled pain     Notify your health care provider if you experience any of the following:  redness, tenderness, or signs of infection (pain, swelling, redness, odor or green/yellow discharge around incision site)     Notify your health care provider if you experience any of the following:  difficulty breathing or increased cough     Notify your health care provider if you experience any of the following:  severe persistent headache     Notify your health care provider if you experience any of the following:  worsening rash     Notify your health care provider if you experience any of the following:  persistent dizziness, light-headedness, or visual disturbances     Notify your health care provider if you experience any  of the following:  increased confusion or weakness     Remove dressing in 24 hours       Medications:  Reconciled Home Medications:   Current Discharge Medication List      START taking these medications    Details   !! hydrocodone-acetaminophen 10-325mg (NORCO)  mg Tab Take 1 tablet by mouth every 4 (four) hours as needed for Pain (For pain related to surgery).  Qty: 41 tablet, Refills: 0       !! - Potential duplicate medications found. Please discuss with provider.      CONTINUE these medications which have NOT CHANGED    Details   atorvastatin (LIPITOR) 20 MG tablet Take 40 mg by mouth every evening.       bisacodyl (DULCOLAX) 5 mg EC tablet Take 5 mg by mouth daily as needed for Constipation.      CALCIUM CARBONATE/VITAMIN D3 (VITAMIN D-3 ORAL) Take 10,000 Units by mouth once daily.      escitalopram oxalate (LEXAPRO) 20 MG tablet Take 20 mg by mouth every evening.       fluticasone (FLONASE) 50 mcg/actuation nasal spray 1 spray by Each Nare route daily as needed for Rhinitis.      !! hydrocodone-acetaminophen 10-325mg (NORCO)  mg Tab Take 1 tablet by mouth every 4 (four) hours as needed.       levothyroxine (SYNTHROID) 88 MCG tablet Take 88 mcg by mouth before breakfast.       LORazepam (ATIVAN) 0.5 MG tablet Take 1 tablet by mouth daily as needed for Anxiety.       losartan (COZAAR) 50 MG tablet Take 50 mg by mouth every evening.   Refills: 11      nicotine (NICODERM CQ) 7 mg/24 hr Place 1 patch onto the skin once daily.  Qty: 14 patch, Refills: 0      tiZANidine (ZANAFLEX) 4 MG tablet Take 4 mg by mouth every evening.      tolterodine (DETROL LA) 4 MG 24 hr capsule Take 4 mg by mouth nightly as needed.      nicotine (NICODERM CQ) 14 mg/24 hr Place 1 patch onto the skin once daily.  Qty: 42 patch, Refills: 0       !! - Potential duplicate medications found. Please discuss with provider.          GO Rosa  Thoracic Surgery  [facility]

## 2018-01-22 NOTE — PROGRESS NOTES
[facility]  Thoracic Surgery  Progress Note    Subjective:     History of Present Illness:   72 y.o. 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 This nodule was found after the patient had a CXR done for pre-op clearance ahead of a knee replacement. CT was done showing the right lower lobe mass as well as a 6.4mm nodule in the right upper lobe that did not light up on PET scan; however there was a 6mm cervical node of unclear etiology that was noted on the PET. She remains active, does her own shopping and housekeeping, but lives with her daughter mainly due to orthopedic issues. She has smoked 2-3 packs per day since she was 15, but recently cut back to 1/2 pack per day. She denies recurrent pneumonia, SOB, cough, bloody sputum, recent weight loss, or fatigue. She mainly endorses joint pain. Patient and daughters state she has already undergone PFTs and Cardiac clearance.     Post-Op Info:  Procedure(s) (LRB):  THORACOSCOPY-VIDEO ASSISTED (VATS) W/ LOBECTOMY (Right)  DISSECTION-LYMPH NODE (Right)  BRONCHOSCOPY-OPERATIVE,FLEXIBLE (N/A)  MEDIASTINOSCOPY (N/A)   3 Days Post-Op     Interval History: NAEON. Saturating well on room air but daughter reports she puts on NC O2 for comfort. Pain well controlled. Voided after spivey removed.     Medications:  Continuous Infusions:  Scheduled Meds:   acetaminophen  1,000 mg Oral Q8H    albuterol-ipratropium 2.5mg-0.5mg/3mL  3 mL Nebulization Q4H WAKE    atorvastatin  40 mg Oral QHS    enoxparin  40 mg Subcutaneous Q24H    escitalopram oxalate  20 mg Oral QHS    ibuprofen  400 mg Oral Q6H    levothyroxine  88 mcg Oral Before breakfast    lidocaine  1 patch Transdermal Q24H    nicotine  1 patch Transdermal Daily    oxybutynin  10 mg Oral Nightly     pantoprazole  40 mg Oral Before breakfast    polyethylene glycol  17 g Oral Daily    sodium chloride 0.9%  3 mL Intravenous Q8H    tiZANidine  4 mg Oral Q8H    vitamin D  2,000 Units Oral Daily     PRN Meds:aluminum-magnesium hydroxide-simethicone, bisacodyl, calcium carbonate, lactulose, metoclopramide HCl, ondansetron, oxyCODONE, oxyCODONE     Review of patient's allergies indicates:  No Known Allergies  Objective:     Vital Signs (Most Recent):  Temp: 97.6 °F (36.4 °C) (01/22/18 0732)  Pulse: 82 (01/22/18 0732)  Resp: 16 (01/22/18 0732)  BP: (!) 110/56 (01/22/18 0732)  SpO2: 97 % (01/22/18 0732) Vital Signs (24h Range):  Temp:  [96.8 °F (36 °C)-97.6 °F (36.4 °C)] 97.6 °F (36.4 °C)  Pulse:  [] 82  Resp:  [16-20] 16  SpO2:  [89 %-98 %] 97 %  BP: ()/(52-61) 110/56     Intake/Output - Last 3 Shifts       01/20 0700 - 01/21 0659 01/21 0700 - 01/22 0659 01/22 0700 - 01/23 0659    P.O. 760 1480     I.V. (mL/kg)       IV Piggyback       Total Intake(mL/kg) 760 (10.5) 1480 (20.4)     Urine (mL/kg/hr) 1000 (0.6) 2525 (1.4)     Emesis/NG output  0 (0)     Other  0 (0)     Stool  0 (0)     Chest Tube 145 (0.1)      Total Output 1145 2525      Net -385 -1045             Stool Occurrence  0 x     Emesis Occurrence  0 x           SpO2: 97 %  O2 Device (Oxygen Therapy): nasal cannula    Physical Exam   Constitutional: She is oriented to person, place, and time. She appears well-developed and well-nourished. No distress.   HENT:   Head: Normocephalic and atraumatic.   Eyes: Conjunctivae are normal.   Neck: Normal range of motion. Neck supple.   Mediastinal incision CDI no swelling   Cardiovascular: Normal rate, regular rhythm and intact distal pulses.    Pulmonary/Chest: Effort normal. No respiratory distress.   Incisions on Right from VATS CDI    Abdominal: Soft. She exhibits no distension. There is no tenderness.   Musculoskeletal: Normal range of motion. She exhibits no edema or deformity.   Neurological: She  is alert and oriented to person, place, and time. No cranial nerve deficit. Coordination normal.   Skin: Skin is warm. She is not diaphoretic.   Nursing note and vitals reviewed.      Significant Labs:  BMP: No results for input(s): GLU, NA, K, CL, CO2, BUN, CREATININE, CALCIUM, MG in the last 48 hours.  CBC:     Recent Labs  Lab 01/20/18  0915   WBC 13.73*   RBC 3.88*   HGB 11.6*   HCT 36.5*      MCV 94   MCH 29.9   MCHC 31.8*       Significant Diagnostics:  CXR: I have reviewed all pertinent results/findings within the past 24 hours    VTE Risk Mitigation         Ordered     enoxaparin injection 40 mg  Every 24 hours (non-standard times)     Route:  Subcutaneous        01/19/18 1210     Medium Risk of VTE  Once      01/19/18 1210        Assessment/Plan:     * Adenocarcinoma, lung    71 y/o F 3 Days Post-Op Right lower lobectomy and mediastinoscopy.    Oral pain medications oxy PRN, scheduled tylenol, ibuprofen tizanidine   OOB today, ambulate with nursing or PT  Reg diet   DVT/GI ppx  Home meds    Dispo- DC when Home health PT/OT secured               GO Rosa  Thoracic Surgery  [facility]

## 2018-01-22 NOTE — SUBJECTIVE & OBJECTIVE
Interval History: NAEON. Saturating well on room air but daughter reports she puts on NC O2 for comfort. Pain well controlled. Voided after spivey removed.     Medications:  Continuous Infusions:  Scheduled Meds:   acetaminophen  1,000 mg Oral Q8H    albuterol-ipratropium 2.5mg-0.5mg/3mL  3 mL Nebulization Q4H WAKE    atorvastatin  40 mg Oral QHS    enoxparin  40 mg Subcutaneous Q24H    escitalopram oxalate  20 mg Oral QHS    ibuprofen  400 mg Oral Q6H    levothyroxine  88 mcg Oral Before breakfast    lidocaine  1 patch Transdermal Q24H    nicotine  1 patch Transdermal Daily    oxybutynin  10 mg Oral Nightly    pantoprazole  40 mg Oral Before breakfast    polyethylene glycol  17 g Oral Daily    sodium chloride 0.9%  3 mL Intravenous Q8H    tiZANidine  4 mg Oral Q8H    vitamin D  2,000 Units Oral Daily     PRN Meds:aluminum-magnesium hydroxide-simethicone, bisacodyl, calcium carbonate, lactulose, metoclopramide HCl, ondansetron, oxyCODONE, oxyCODONE     Review of patient's allergies indicates:  No Known Allergies  Objective:     Vital Signs (Most Recent):  Temp: 97.6 °F (36.4 °C) (01/22/18 0732)  Pulse: 82 (01/22/18 0732)  Resp: 16 (01/22/18 0732)  BP: (!) 110/56 (01/22/18 0732)  SpO2: 97 % (01/22/18 0732) Vital Signs (24h Range):  Temp:  [96.8 °F (36 °C)-97.6 °F (36.4 °C)] 97.6 °F (36.4 °C)  Pulse:  [] 82  Resp:  [16-20] 16  SpO2:  [89 %-98 %] 97 %  BP: ()/(52-61) 110/56     Intake/Output - Last 3 Shifts       01/20 0700 - 01/21 0659 01/21 0700 - 01/22 0659 01/22 0700 - 01/23 0659    P.O. 760 1480     I.V. (mL/kg)       IV Piggyback       Total Intake(mL/kg) 760 (10.5) 1480 (20.4)     Urine (mL/kg/hr) 1000 (0.6) 2525 (1.4)     Emesis/NG output  0 (0)     Other  0 (0)     Stool  0 (0)     Chest Tube 145 (0.1)      Total Output 1145 2525      Net -385 -1045             Stool Occurrence  0 x     Emesis Occurrence  0 x           SpO2: 97 %  O2 Device (Oxygen Therapy): nasal cannula    Physical  Exam   Constitutional: She is oriented to person, place, and time. She appears well-developed and well-nourished. No distress.   HENT:   Head: Normocephalic and atraumatic.   Eyes: Conjunctivae are normal.   Neck: Normal range of motion. Neck supple.   Mediastinal incision CDI no swelling   Cardiovascular: Normal rate, regular rhythm and intact distal pulses.    Pulmonary/Chest: Effort normal. No respiratory distress.   Incisions on Right from VATS CDI    Abdominal: Soft. She exhibits no distension. There is no tenderness.   Musculoskeletal: Normal range of motion. She exhibits no edema or deformity.   Neurological: She is alert and oriented to person, place, and time. No cranial nerve deficit. Coordination normal.   Skin: Skin is warm. She is not diaphoretic.   Nursing note and vitals reviewed.      Significant Labs:  BMP: No results for input(s): GLU, NA, K, CL, CO2, BUN, CREATININE, CALCIUM, MG in the last 48 hours.  CBC:     Recent Labs  Lab 01/20/18  0915   WBC 13.73*   RBC 3.88*   HGB 11.6*   HCT 36.5*      MCV 94   MCH 29.9   MCHC 31.8*       Significant Diagnostics:  CXR: I have reviewed all pertinent results/findings within the past 24 hours    VTE Risk Mitigation         Ordered     enoxaparin injection 40 mg  Every 24 hours (non-standard times)     Route:  Subcutaneous        01/19/18 1210     Medium Risk of VTE  Once      01/19/18 1210

## 2018-01-22 NOTE — ASSESSMENT & PLAN NOTE
73 y/o F 3 Days Post-Op Right lower lobectomy and mediastinoscopy.    Oral pain medications oxy PRN, scheduled tylenol, ibuprofen tizanidine   OOB today, ambulate with nursing or PT  Reg diet   DVT/GI ppx  Home meds    Dispo- DC when Home health PT/OT secured

## 2018-01-22 NOTE — PLAN OF CARE
CHRISTINE spoke with the CM and the pt will dc today with HH.  CHRISTINE sent a referral to [facility] through [facility].  The SW will f/u shortly.    Patricia Norton, AKIN x [phone]

## 2018-01-22 NOTE — PLAN OF CARE
Problem: Patient Care Overview  Goal: Plan of Care Review  Plan of care reviewed, all questions and concerns addressed. Patient vital signs remain normal and stable for patient, with no complaints of SOB, headaches, or dizziness. Patient urine output remains adequate, Patient is tolerating diet well with no complaints of nausea or vomiting. Patient pain well controlled with ordered pain medications. Patient is resting quietly with side rails up and call light with in reach. Will continue to monitor patient status.

## 2018-01-22 NOTE — HOSPITAL COURSE
1/19/18 -1. Diagnostic Flexible Bronchoscopy.  2. Cervical Mediastinoscopy with Biopsy.  3. Right Thoracoscopy with Lower Lobectomy.  4. Right Thoracoscopy with Mediastinal and Regional Lymphadenectomy  1/20/18 - Transitioned to PO pain medications. Regular diet. Working with PT/OT. Chest tube removed.   1/21/18 - Spivey replaced over night for urinary retention. Pain well controlled. Weaned from NC O2.   1/22/18 -  Voided after spivey removed. Tolerating diet. Stable for discharge with Home Health Services.

## 2018-01-22 NOTE — PLAN OF CARE
[facility]    HOME HEALTH ORDERS  FACE TO FACE ENCOUNTER    Patient Name: Chhaya Sharp  YOB: 1945    PCP: HEAVEN Gamboa NP   PCP Address: [address] / [place] [address]  PCP Phone Number: [phone]  PCP Fax: [phone]    Encounter Date: 01/22/2018    Admit to Home Health    Diagnoses:  Active Hospital Problems    Diagnosis  POA    *Adenocarcinoma, lung [C34.90]  Yes    Depression [F32.9]  Yes    Chronic, continuous use of opioids [F11.90]  Yes    Pituitary adenoma [D35.2]  Yes    Constipation [K59.00]  Yes    Other emphysema [J43.8]  Yes    Varicose veins of both lower extremities [I83.93]  Yes    HTN (hypertension) [I10]  Yes    HLD (hyperlipidemia) [E78.5]  Yes    Current smoker [F17.200]  Yes      Resolved Hospital Problems    Diagnosis Date Resolved POA   No resolved problems to display.       No future appointments.        I have seen and examined this patient face to face today. My clinical findings that support the need for the home health skilled services and home bound status are the following:  Weakness/numbness causing balance and gait disturbance due to Weakness/Debility, Malignancy/Cancer and Surgery making it taxing to leave home.  Medical restrictions requiring assistance of another human to leave home due to  Dyspnea on exertion (SOB) and Post surgery monitoring.    Allergies:Review of patient's allergies indicates:  No Known Allergies    Diet: regular diet    Activities: activity as tolerated    Nursing:   SN to complete comprehensive assessment including routine vital signs. Instruct on disease process and s/s of complications to report to MD. Review/verify medication list sent home with the patient at time of discharge  and instruct patient/caregiver as needed. Frequency may be adjusted depending on start of care date.    Notify MD if SBP > 160 or < 90; DBP > 90 or < 50; HR > 120 or < 50; Temp > 101; Other:         CONSULTS:     Physical Therapy to evaluate and treat. Evaluate for home safety and equipment needs; Establish/upgrade home exercise program. Perform / instruct on therapeutic exercises, gait training, transfer training, and Range of Motion.  Occupational Therapy to evaluate and treat. Evaluate home environment for safety and equipment needs. Perform/Instruct on transfers, ADL training, ROM, and therapeutic exercises.  Aide to provide assistance with personal care, ADLs, and vital signs.    MISCELLANEOUS CARE:  N/A    WOUND CARE ORDERS  yes:  Surgical Wound:  Location: Right chest. Change gauze if saturated with each visit. Leave steri strips intact.         Medications: Review discharge medications with patient and family and provide education.      Current Discharge Medication List      CONTINUE these medications which have NOT CHANGED    Details   atorvastatin (LIPITOR) 20 MG tablet Take 40 mg by mouth every evening.       bisacodyl (DULCOLAX) 5 mg EC tablet Take 5 mg by mouth daily as needed for Constipation.      CALCIUM CARBONATE/VITAMIN D3 (VITAMIN D-3 ORAL) Take 10,000 Units by mouth once daily.      escitalopram oxalate (LEXAPRO) 20 MG tablet Take 20 mg by mouth every evening.       fluticasone (FLONASE) 50 mcg/actuation nasal spray 1 spray by Each Nare route daily as needed for Rhinitis.      hydrocodone-acetaminophen 10-325mg (NORCO)  mg Tab Take 1 tablet by mouth every 4 (four) hours as needed.       levothyroxine (SYNTHROID) 88 MCG tablet Take 88 mcg by mouth before breakfast.       LORazepam (ATIVAN) 0.5 MG tablet Take 1 tablet by mouth daily as needed for Anxiety.       losartan (COZAAR) 50 MG tablet Take 50 mg by mouth every evening.   Refills: 11      nicotine (NICODERM CQ) 7 mg/24 hr Place 1 patch onto the skin once daily.  Qty: 14 patch, Refills: 0      tiZANidine (ZANAFLEX) 4 MG tablet Take 4 mg by mouth every evening.      tolterodine (DETROL LA) 4 MG 24 hr capsule Take 4 mg by mouth nightly as  needed.      nicotine (NICODERM CQ) 14 mg/24 hr Place 1 patch onto the skin once daily.  Qty: 42 patch, Refills: 0             I certify that this patient is confined to her home and needs intermittent skilled nursing care, physical therapy and occupational therapy.

## 2018-01-22 NOTE — PT/OT/SLP PROGRESS
Physical Therapy      Patient Name:  Chhaya Sharp   MRN:  [id number]    Patient not seen today secondary to  (hospital discharge before PT session). Full discharge summary to follow.    Mark Ralph, PT   1/22/2018

## 2018-01-22 NOTE — PLAN OF CARE
S/P THORACOSCOPY-VIDEO ASSISTED (VATS) W/ LOBECTOMY (Right)  DISSECTION-LYMPH NODE (Right)  BRONCHOSCOPY-OPERATIVE,FLEXIBLE (N/A)  MEDIASTINOSCOPY (N/A) on 1/19/18. CM spoke with pt along with daughters concerning dc plan. Plan to dc home today with HH. CM offered HH list, declined. Pt states has used [facility] in the past. IMM form signed, original in chart, and copy given to pt. Will update        01/22/18 1013   Discharge Assessment   Assessment Type Discharge Planning Assessment   Confirmed/corrected address and phone number on facesheet? Yes   Assessment information obtained from? Patient   Expected Length of Stay (days) 2   Communicated expected length of stay with patient/caregiver yes   Prior to hospitilization cognitive status: Alert/Oriented   Prior to hospitalization functional status: Independent   Current cognitive status: Alert/Oriented   Current Functional Status: Independent   Lives With child(tigre), adult   Able to Return to Prior Arrangements yes   Is patient able to care for self after discharge? Yes   Who are your caregiver(s) and their phone number(s)? Lisa/daughter [phone]    Patient's perception of discharge disposition home or selfcare   Readmission Within The Last 30 Days no previous admission in last 30 days   Patient currently being followed by outpatient case management? No   Patient currently receives any other outside agency services? No   Equipment Currently Used at Home none   Do you have any problems affording any of your prescribed medications? No   Is the patient taking medications as prescribed? yes   Does the patient have transportation home? Yes   Transportation Available family or friend will provide   Does the patient receive services at the [facility]? No   Discharge Plan A Home with family;Home Health   Discharge Plan B Home with family;Home Health   Patient/Family In Agreement With Plan yes

## 2018-01-22 NOTE — ANESTHESIA RELEASE NOTE
"Anesthesia Release from PACU Note    Patient: Chhaya Sharp    Procedure(s) Performed: Procedure(s) (LRB):  THORACOSCOPY-VIDEO ASSISTED (VATS) W/ LOBECTOMY (Right)  DISSECTION-LYMPH NODE (Right)  BRONCHOSCOPY-OPERATIVE,FLEXIBLE (N/A)  MEDIASTINOSCOPY (N/A)    Anesthesia type: general    Post pain: Adequate analgesia    Post assessment: no apparent anesthetic complications    Last Vitals:   Visit Vitals  BP (!) 102/57   Pulse 89   Temp 36.1 °C (96.9 °F)   Resp 16   Ht 5' 4" (1.626 m)   Wt 72.6 kg (160 lb)   SpO2 97%   Breastfeeding? No   BMI 27.46 kg/m²       Post vital signs: stable    Level of consciousness: awake, alert  and oriented    Nausea/Vomiting: no nausea/no vomiting    Complications: none    Airway Patency: patent    Respiratory: unassisted    Cardiovascular: stable and blood pressure at baseline    Hydration: euvolemic     "

## 2018-01-22 NOTE — ASSESSMENT & PLAN NOTE
71 y/o F 3 Days Post-Op Right lower lobectomy and mediastinoscopy.    Oral pain medications oxy PRN, scheduled tylenol, ibuprofen tizanidine   OOB today, ambulate with nursing or PT  Reg diet   DVT/GI ppx  Home meds    Dispo- DC when Home health PT/OT secured

## 2018-01-22 NOTE — HPI
72 y.o. 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 This nodule was found after the patient had a CXR done for pre-op clearance ahead of a knee replacement. CT was done showing the right lower lobe mass as well as a 6.4mm nodule in the right upper lobe that did not light up on PET scan; however there was a 6mm cervical node of unclear etiology that was noted on the PET. She remains active, does her own shopping and housekeeping, but lives with her daughter mainly due to orthopedic issues. She has smoked 2-3 packs per day since she was 15, but recently cut back to 1/2 pack per day. She denies recurrent pneumonia, SOB, cough, bloody sputum, recent weight loss, or fatigue. She mainly endorses joint pain. Patient and daughters state she has already undergone PFTs and Cardiac clearance.

## 2018-01-22 NOTE — PLAN OF CARE
Pt was accepted by [facility].  They will see her tomorrow.    Patricia Norton, [facility] x [phone]

## 2018-01-23 ENCOUNTER — TELEPHONE (OUTPATIENT)
Dept: CARDIOTHORACIC SURGERY | Facility: CLINIC | Age: 73
End: 2018-01-23

## 2018-01-23 NOTE — TELEPHONE ENCOUNTER
Called to check on pt following recent hospital discharge for surgery. Spoke with daughter Lisa.  Rested comfortably last nightwith some noticeable wheezing concerns   Ambulatory this morning w/o and c/o SOB or wheezing. Pain controled with Norco and tolerating getting up and showering. No report of fever or chills.   Reviewed f/u appointment and is agreeable with date and time.  Appointment slip mailed.

## 2018-01-30 ENCOUNTER — DOCUMENTATION ONLY (OUTPATIENT)
Dept: CARDIOTHORACIC SURGERY | Facility: CLINIC | Age: 73
End: 2018-01-30

## 2018-01-30 NOTE — PROGRESS NOTES
Faxed pt's operative report, pathology report, and discharge summary to Mariana Gamboa NP at fax # [phone]. They will work on heme/onc referral. Called to confirm receipt of fax at phone # [phone].

## 2018-02-05 ENCOUNTER — OFFICE VISIT (OUTPATIENT)
Dept: CARDIOTHORACIC SURGERY | Facility: CLINIC | Age: 73
End: 2018-02-05
Payer: MEDICARE

## 2018-02-05 ENCOUNTER — HOSPITAL ENCOUNTER (OUTPATIENT)
Dept: RADIOLOGY | Facility: HOSPITAL | Age: 73
Discharge: HOME | End: 2018-02-05
Attending: THORACIC SURGERY (CARDIOTHORACIC VASCULAR SURGERY)
Payer: MEDICARE

## 2018-02-05 VITALS
HEART RATE: 90 BPM | BODY MASS INDEX: 27.28 KG/M2 | DIASTOLIC BLOOD PRESSURE: 71 MMHG | OXYGEN SATURATION: 96 % | WEIGHT: 159.81 LBS | HEIGHT: 64 IN | SYSTOLIC BLOOD PRESSURE: 112 MMHG

## 2018-02-05 DIAGNOSIS — C34.31 MALIGNANT NEOPLASM OF LOWER LOBE OF RIGHT LUNG: Primary | ICD-10-CM

## 2018-02-05 DIAGNOSIS — C34.91 ADENOCARCINOMA OF RIGHT LUNG: ICD-10-CM

## 2018-02-05 PROCEDURE — 99024 POSTOP FOLLOW-UP VISIT: CPT | Mod: POP,,, | Performed by: THORACIC SURGERY (CARDIOTHORACIC VASCULAR SURGERY)

## 2018-02-05 PROCEDURE — 99999 PR PBB SHADOW E&M-EST. PATIENT-LVL IV: CPT | Mod: PBBFAC,,, | Performed by: THORACIC SURGERY (CARDIOTHORACIC VASCULAR SURGERY)

## 2018-02-05 PROCEDURE — 71046 X-RAY EXAM CHEST 2 VIEWS: CPT | Mod: TC,FY

## 2018-02-05 PROCEDURE — 99214 OFFICE O/P EST MOD 30 MIN: CPT | Mod: PBBFAC | Performed by: THORACIC SURGERY (CARDIOTHORACIC VASCULAR SURGERY)

## 2018-02-05 PROCEDURE — 71046 X-RAY EXAM CHEST 2 VIEWS: CPT | Mod: 26,,, | Performed by: RADIOLOGY

## 2018-02-07 NOTE — ADDENDUM NOTE
Addendum  created 02/07/18 1329 by Gregorio Clifton CRNA    Anesthesia Intra Blocks edited, Sign clinical note

## 2018-02-16 ENCOUNTER — TELEPHONE (OUTPATIENT)
Dept: CARDIOTHORACIC SURGERY | Facility: CLINIC | Age: 73
End: 2018-02-16

## 2018-02-16 DIAGNOSIS — R91.1 LUNG NODULE: Primary | ICD-10-CM

## 2018-02-16 NOTE — TELEPHONE ENCOUNTER
----- Message from Hermilo Mcintosh sent at 2/16/2018  8:55 AM CST -----  Contact: Lisa//Daughter  Caller states that (s)he needs to speak with nurse in ref to starting the referral for chemo//please call back at [phone]//thank you

## 2018-02-16 NOTE — TELEPHONE ENCOUNTER
Returned call and spoke with daughter Lisa.  Asking for a referral to be seen by Dr. Jenae Aguilar in [facility].  Faxed referral, op note, progress note, and path report to [phone].

## 2018-04-13 NOTE — PROGRESS NOTES
"Subjective:       Patient ID: Chhaya Sharp is a 72 y.o. female.    Chief Complaint: Post-op Evaluation    Diagnosis:  NSCLC    Pre-operative therapy: N/A    Procedure(s) and date(s): 1. 1/29/18: Bronchoscopy, Mediastinoscopy, Right VATS Lower Lobectomy, MLND    Pathology: 4.2cm moderately differentiated adenocarcinoma, margins negative, +VPI, +LVI; Levels 4R, 4L, 7, 8, 9, 11, 12 = negative.  pT2N0      Post-operative therapy: Refer to heme/onc in [place].     HPI     72 year old female returns for follow up s/p right VATS right lower lobectomy and MLND for resection of adenocarcinoma, pT2N0. Other than urinary retention, postop course uncomplicated. Today she reports feeling well. Pain moderately controlled with narcotics but improving. Uses a walker or wheelchair outside of her home as she fatigues early. Also reports this is improving. Denies fever, chills, SOB at rest or exertion, CP, N//V or changes in bowel and bladder functioning.      Review of Systems   Constitutional: Positive for activity change and fatigue. Negative for appetite change and fever.   HENT: Negative for congestion, trouble swallowing and voice change.    Respiratory: Positive for chest tightness. Negative for cough, shortness of breath and wheezing.    Cardiovascular: Negative for chest pain and palpitations.   Gastrointestinal: Negative for abdominal distention, abdominal pain, nausea and vomiting.   Musculoskeletal: Positive for arthralgias and back pain.   Neurological: Negative for dizziness and light-headedness.       Objective:       Vitals:    02/05/18 1425   BP: 112/71   Pulse: 90   SpO2: 96%   Weight: 72.5 kg (159 lb 13.3 oz)   Height: 5' 4" (1.626 m)   PainSc: 0-No pain     Physical Exam   Constitutional: She is oriented to person, place, and time. She appears well-developed and well-nourished.   HENT:   Head: Normocephalic.   Eyes: Pupils are equal, round, and reactive to light.   Neck: Normal range of motion. "   Cardiovascular: Normal rate, regular rhythm, normal heart sounds and intact distal pulses.    Pulmonary/Chest: Effort normal and breath sounds normal. She exhibits tenderness.   Chest tube incision healing well with suture intact. Other incisions healing well. Steri strips removed.    Abdominal: Soft. Bowel sounds are normal. She exhibits no distension. There is no tenderness.   Musculoskeletal: Normal range of motion.   Neurological: She is alert and oriented to person, place, and time.   Psychiatric: She has a normal mood and affect.   Vitals reviewed.      2/5/18 CXR:  1.  Stable postoperative appearance of the chest with dependent RIGHT pleural fluid in this patient status post RIGHT lower lobectomy.  No pneumothorax or other unusual finding identified.  2.  Outside chest CT also revealed a 0.6 cm nodule and multifocal pulmonary lucencies consistent with centrilobular emphysema, pulmonary Langerhans cell histiocytosis or both.  These findings are below the limits of chest radiograph resolution and thus cannot be reevaluated today.  For this reason I recommend surveillance with chest CT; schedule for such surveillance CTs will be determined by clinical considerations.     Assessment:       72 year old female returns for follow up s/p right VATS right lower lobectomy and MLND for resection of adenocarcinoma.    Plan:       Doing well. She would like to see an oncologist closer to home. Return to thoracic surgery clinic in 6 months with repeat chest CT.     ATTENDING ATTESTATION:    I evaluated the patient and I agree with the assessment and plan.  Doing well.  Incisions healing.  CXR shows right effusion.  Path as above.  Discussed with patient and her daughter that adjuvant chemotherapy is indicated in her case given tumor >4cm (with lymphovascular invasion as well but no +LN).  She seems opposed to chemo, but I STRONGLY encouraged a consultation with medical oncology close to home ([facility] [place]) to  discuss.  She will decide and arrange this on her own.  She was provided with a copy of her operative and final pathology reports.  Will ask pathology to send specimen for mutational studies.   Abdomen

## 2018-08-06 ENCOUNTER — HOSPITAL ENCOUNTER (OUTPATIENT)
Dept: RADIOLOGY | Facility: HOSPITAL | Age: 73
Discharge: HOME | End: 2018-08-06
Attending: THORACIC SURGERY (CARDIOTHORACIC VASCULAR SURGERY)
Payer: MEDICARE

## 2018-08-06 ENCOUNTER — OFFICE VISIT (OUTPATIENT)
Dept: CARDIOTHORACIC SURGERY | Facility: CLINIC | Age: 73
End: 2018-08-06
Attending: THORACIC SURGERY (CARDIOTHORACIC VASCULAR SURGERY)
Payer: MEDICARE

## 2018-08-06 VITALS
SYSTOLIC BLOOD PRESSURE: 137 MMHG | BODY MASS INDEX: 31.39 KG/M2 | OXYGEN SATURATION: 96 % | WEIGHT: 183.88 LBS | DIASTOLIC BLOOD PRESSURE: 80 MMHG | HEIGHT: 64 IN

## 2018-08-06 DIAGNOSIS — C34.31 MALIGNANT NEOPLASM OF LOWER LOBE OF RIGHT LUNG: ICD-10-CM

## 2018-08-06 DIAGNOSIS — C34.31 MALIGNANT NEOPLASM OF LOWER LOBE OF RIGHT LUNG: Primary | ICD-10-CM

## 2018-08-06 PROCEDURE — 71250 CT THORAX DX C-: CPT | Mod: 26,,, | Performed by: RADIOLOGY

## 2018-08-06 PROCEDURE — 99213 OFFICE O/P EST LOW 20 MIN: CPT | Mod: S$PBB,,, | Performed by: THORACIC SURGERY (CARDIOTHORACIC VASCULAR SURGERY)

## 2018-08-06 PROCEDURE — 71250 CT THORAX DX C-: CPT | Mod: TC

## 2018-08-06 PROCEDURE — 99999 PR PBB SHADOW E&M-EST. PATIENT-LVL III: CPT | Mod: PBBFAC,,, | Performed by: THORACIC SURGERY (CARDIOTHORACIC VASCULAR SURGERY)

## 2018-08-06 PROCEDURE — 99213 OFFICE O/P EST LOW 20 MIN: CPT | Mod: PBBFAC,25 | Performed by: THORACIC SURGERY (CARDIOTHORACIC VASCULAR SURGERY)

## 2018-08-06 NOTE — PROGRESS NOTES
"Subjective:       Patient ID: Chhaya Sharp is a 72 y.o. female.    Chief Complaint: No chief complaint on file.    Diagnosis:  NSCLC     Pre-operative therapy: N/A     Procedure(s) and date(s): 1. 1/29/18: Bronchoscopy, Mediastinoscopy, Right VATS Lower Lobectomy, MLND     Pathology: 4.2cm moderately differentiated adenocarcinoma, margins negative, +VPI, +LVI; Levels 4R, 4L, 7, 8, 9, 11, 12 = negative.  pT2N0      Post-operative therapy: Adjuvant chemotherapy (Dr. Aguilar)     HPI   72 year old female returns for follow up s/p right VATS right lower lobectomy and MLND for resection of adenocarcinoma, pT2N0. Today she reports feeling well. She reports occasional wheezing and SOB unrelated to exertion due to incisional pain on right lower thorax. Patient no longer uses a walker at home. Planning to have her knee replaced soon.  Review of Systems   Constitutional: Positive for appetite change (increased). Negative for activity change, chills, fatigue and fever.   HENT: Negative for congestion and sinus pain.    Respiratory: Positive for shortness of breath (w/ deep breathing) and wheezing. Negative for cough.    Cardiovascular: Negative for chest pain and palpitations.   Gastrointestinal: Positive for constipation. Negative for abdominal distention and abdominal pain.   Genitourinary: Negative for difficulty urinating and dysuria.   Musculoskeletal:        Right knee pain   Skin: Negative for rash and wound.   Neurological: Negative for dizziness and light-headedness.         Objective:       Vitals:    08/06/18 1547   BP: 137/80   SpO2: 96%   Weight: 83.4 kg (183 lb 13.8 oz)   Height: 5' 4" (1.626 m)   PainSc: 0-No pain       Physical Exam   Constitutional: She is oriented to person, place, and time. She appears well-developed and well-nourished. No distress.   HENT:   Head: Normocephalic and atraumatic.   Eyes: EOM are normal.   Neck: Normal range of motion. Neck supple.   Cardiovascular: Normal rate and " regular rhythm.    Pulmonary/Chest: Effort normal. No respiratory distress. She has no wheezes. She has no rales.   Abdominal: Soft. She exhibits no distension. There is no tenderness.   Neurological: She is alert and oriented to person, place, and time.   Skin: Skin is dry.   Psychiatric: She has a normal mood and affect.         CT 08/06/18 - Will follow up on final CT read    Assessment:       72 year old female returns for follow up s/p right VATS right lower lobectomy and MLND for resection of adenocarcinoma, pT2N0    Plan:             No evidence of recurrence or overt metastasis. Will f/u on final CT read.  RTC as needed. Follow up with oncologist in [place].     ATTENDING ATTESTATION:    I evaluated the patient and I agree with the assessment and plan.  Doing well.  CT shows no evidence of disease recurrence or metastasis.  Will defer further surveillance to Dr. Aguilar.  RTC with me PRN.

## 2019-09-05 NOTE — NURSING
Discharge instructions reviewed with pt. No further questions at this time.    This is the Subsequent Medicare Annual Wellness Exam, performed 12 months or more after the Initial AWV or the last Subsequent AWV    I have reviewed the patient's medical history in detail and updated the computerized patient record. History     Past Medical History:   Diagnosis Date    Aneurysm ([facility] [address]     splenic artery aneurysm - no longer needs to be followed up - will never be a problem    Arthritis     back    Chronic pain     back    CKD (chronic kidney disease) stage 2, GFR 60-89 ml/min     Hyperlipidemia     Hypertension     Ill-defined condition     walks with a cane    Ill-defined condition     cardiac calcium test - some build up/stress test is \"fine\" per pt    Lumbar stenosis     Migraine     Other ill-defined conditions(799.89)     wheezing with allergies    Other ill-defined conditions(799.89)     GI bleed - stomach - NSAID use    Overactive bladder     PUD (peptic ulcer disease)     Stenosis of lumbosacral spine     Thromboembolus ([address]     PE      Past Surgical History:   Procedure Laterality Date    COLONOSCOPY N/A 10/1/2018    COL[facility] ENDOSCOPY    HX HYSTERECTOMY      HX ORTHOPAEDIC  2009    spinal fusion/has not had a lumbar laminectomy    HX ORTHOPAEDIC Bilateral     bunionectomy - bilateral once and unilateral once    HX ORTHOPAEDIC Bilateral     carpal tunnel release    HX TONSILLECTOMY      T & A    HX UROLOGICAL  2012    bladder repair     Current Outpatient Medications   Medication Sig Dispense Refill    zolpidem (AMBIEN) 10 mg tablet Take 1 Tab by mouth nightly as needed for Sleep. Max Daily Amount: 10 mg. 30 Tab 2    montelukast (SINGULAIR) 10 mg tablet Take 1 Tab by mouth daily. 90 Tab 1    pneumococcal 23-dino ps vaccine (PNEUMOVAX 23) 25 mcg/0.5 mL injection 0.5 mL by IntraMUSCular route once for 1 dose.  0.5 mL 0    varicella-zoster recombinant, PF, (SHINGRIX, PF,) 50 mcg/0.5 mL susr injection 0.5mL by IntraMUSCular route once now and then repeat in 2-6 months 0.5 mL 1    diph,pertuss,acel,,tetanus vac,PF, (ADACEL) 2 Lf-(2.5-5-3-5 mcg)-5Lf/0.5 mL syrg vaccine 0.5 mL by IntraMUSCular route once for 1 dose. 0.5 mL 0    metoprolol tartrate (LOPRESSOR) 25 mg tablet TAKE 1 TABLET BY MOUTH TWICE DAILY 180 Tab 0    eletriptan (RELPAX) 20 mg tablet Take 20 mg by mouth once as needed. may repeat in 2 hours if necessary      KRILL OIL PO Take  by mouth.  multivitamin (MULTIPLE VITAMINS PO) Take  by mouth.  Lactobacillus acidophilus (ACIDOPHILUS PO) Take  by mouth.  gemfibrozil (LOPID) 600 mg tablet TAKE 1 TABLET BY MOUTH TWICE DAILY 180 Tab 5    ezetimibe (ZETIA) 10 mg tablet TAKE 1 TABLET BY MOUTH DAILY 90 Tab 5    topiramate (TOPAMAX) 200 mg tablet Take 200 mg by mouth two (2) times a day.  cholecalciferol, vitamin D3, 2,000 unit tab Take 2,000 Units by mouth daily.  turmeric (CURCUMIN) Take 600 mg by mouth daily.  acetaminophen (TYLENOL EXTRA STRENGTH PO) Take  by mouth. Takes 2 po as needed for leg or back pain.  cetirizine (ZYRTEC) 10 mg tablet Take 10 mg by mouth daily. Allergies   Allergen Reactions    Macrodantin [Nitrofurantoin Macrocrystalline] Other (comments)     Low WBC    Nitrofurantoin Other (comments)     LOW WBC - 2000    Nsaids (Non-Steroidal Anti-Inflammatory Drug) Other (comments)     GI bleed.  Other Plant, Animal, Environmental Other (comments)     Mold, dust, cats, dog allergies. ENVIRONMENTAL ALLERGIES.     Tolmetin Unknown (comments)    Toradol [Ketorolac Tromethamine] Rash     Family History   Problem Relation Age of Onset    Cancer Father         bladder    Cancer Brother         BCCA    Stroke Mother     Cancer Maternal Uncle         leukemia    Breast Cancer Paternal Aunt     Stroke Maternal Grandmother     Colon Cancer Paternal Grandfather     Cancer Maternal Uncle         throat     Social History     Tobacco Use    Smoking status: Never Smoker    Smokeless tobacco: Never Used   Substance Use Topics    Alcohol use: Yes     Comment: very, very rare     Patient Active Problem List   Diagnosis Code    Hypertension I10    Hyperlipidemia E78.5    Chronic pain G89.29    Leukocytosis, unspecified D72.829    Anemia, unspecified D64.9    Dehydration E86.0    Renal failure, acute (HCC) N17.9    Altered mental status R41.82    Stenosis of lumbosacral spine M48.07    Hyponatremia E87.1    Hyperkalemia E87.5    Acidosis M15.0    Metabolic encephalopathy W17.88    Insomnia G47.00       Depression Risk Factor Screening:     3 most recent PHQ Screens 9/5/2019   Little interest or pleasure in doing things Not at all   Feeling down, depressed, irritable, or hopeless Not at all   Total Score PHQ 2 0     Alcohol Risk Factor Screening: You do not drink alcohol or very rarely. Functional Ability and Level of Safety:   Hearing Loss  Hearing is good. Activities of Daily Living  The home contains: handrails  Patient does total self care    Fall Risk  Fall Risk Assessment, last 12 mths 9/5/2019   Able to walk? Yes   Fall in past 12 months? No       Abuse Screen  Patient is not abused    Cognitive Screening   Evaluation of Cognitive Function:  Has your family/caregiver stated any concerns about your memory: no  Normal    Patient Care Team   Patient Care Team:  Jolene Torres MD as PCP - General (Family Practice)  Stacie Flores MD (Neurology)    Assessment/Plan   Education and counseling provided:  Are appropriate based on today's review and evaluation  End-of-Life planning (with patient's consent)  Pneumococcal Vaccine  Screening Mammography  Screening Pap and pelvic (covered once every 2 years)  Colorectal cancer screening tests  Bone mass measurement (DEXA)  Screening for glaucoma    Preop exam for cataracts  Form completed. No contraindications.   No latex allergy or previous problems with surgeries    Hypertension  At goal, takes meds consistently    Hypercholesterolemia  On fibrate and zetia    Allergic rhinitis  Having a bad year, given prednisone in past  No better with antihistamines and nasal steroids    Insomnia  Wants a refill of ambien  Takes for insomnia and migraines as a preventative. ROS- no chest pain or dyspnea, No blood in urine or stool    General Well appearing, A&O X 4  Neck without nodes normal thyroid  Lungs clear to ausculation  CV RRR, No M, R, or G. No edema. Neuro Normal Speech  Psych Oriented to person place and time with normal affect and mood. No hallucinations or abnormal thought  Diagnoses and all orders for this visit:    1. Essential hypertension  -     METABOLIC PANEL, BASIC  -     MICROALBUMIN, UR, RAND W/ MICROALB/CREAT RATIO    2. Insomnia, unspecified type  -     zolpidem (AMBIEN) 10 mg tablet; Take 1 Tab by mouth nightly as needed for Sleep. Max Daily Amount: 10 mg.    3. Hyperlipidemia, unspecified hyperlipidemia type  -     LIPID PANEL  -     HEPATIC FUNCTION PANEL    4. Non-seasonal allergic rhinitis due to pollen  -     montelukast (SINGULAIR) 10 mg tablet; Take 1 Tab by mouth daily. 5. Acquired hypothyroidism  -     TSH 3RD GENERATION    6. Preoperative examination    7. Class 1 obesity with serious comorbidity in adult, unspecified BMI, unspecified obesity type    8. Medicare annual wellness visit, subsequent  -     pneumococcal 23-dino ps vaccine (PNEUMOVAX 23) 25 mcg/0.5 mL injection; 0.5 mL by IntraMUSCular route once for 1 dose.  -     varicella-zoster recombinant, PF, (SHINGRIX, PF,) 50 mcg/0.5 mL susr injection; 0.5mL by IntraMUSCular route once now and then repeat in 2-6 months  -     diph,pertuss,acel,,tetanus vac,PF, (ADACEL) 2 Lf-(2.5-5-3-5 mcg)-5Lf/0.5 mL syrg vaccine; 0.5 mL by IntraMUSCular route once for 1 dose. 9. Screening for alcoholism  -     NH ANNUAL ALCOHOL SCREEN 15 MIN    10. Screening for depression  -     DEPRESSION SCREEN ANNUAL    11.  Need for hepatitis C screening test  -     HEPATITIS C AB    12. Disorder of bone and cartilage  -     DEXA BONE DENSITY STUDY AXIAL;  Future        Health Maintenance Due   Topic Date Due    Hepatitis C Screening  1948    DTaP/Tdap/Td series (1 - Tdap) 03/06/1969    Shingrix Vaccine Age 50> (1 of 2) 03/06/1998    GLAUCOMA SCREENING Q2Y  03/06/2013    Bone Densitometry (Dexa) Screening  03/06/2013    Pneumococcal 65+ years (2 of 2 - PPSV23) 01/06/2017    MEDICARE YEARLY EXAM  09/06/2018    Influenza Age 9 to Adult  08/01/2019

## 2020-10-12 ENCOUNTER — OFFICE VISIT (OUTPATIENT)
Dept: ENDOCRINOLOGY | Facility: CLINIC | Age: 75
End: 2020-10-12
Attending: INTERNAL MEDICINE
Payer: MEDICARE

## 2020-10-12 ENCOUNTER — LAB VISIT (OUTPATIENT)
Dept: LAB | Facility: OTHER | Age: 75
End: 2020-10-12
Attending: INTERNAL MEDICINE
Payer: MEDICARE

## 2020-10-12 VITALS
SYSTOLIC BLOOD PRESSURE: 140 MMHG | BODY MASS INDEX: 30.09 KG/M2 | WEIGHT: 176.25 LBS | DIASTOLIC BLOOD PRESSURE: 82 MMHG | HEIGHT: 64 IN | HEART RATE: 72 BPM

## 2020-10-12 DIAGNOSIS — E89.0 POSTABLATIVE HYPOTHYROIDISM: ICD-10-CM

## 2020-10-12 DIAGNOSIS — D35.2 PITUITARY ADENOMA: Primary | ICD-10-CM

## 2020-10-12 DIAGNOSIS — D35.2 PITUITARY ADENOMA: ICD-10-CM

## 2020-10-12 LAB
ALBUMIN SERPL BCP-MCNC: 3.9 G/DL (ref 3.5–5.2)
ALP SERPL-CCNC: 130 U/L (ref 55–135)
ALT SERPL W/O P-5'-P-CCNC: 15 U/L (ref 10–44)
ANION GAP SERPL CALC-SCNC: 11 MMOL/L (ref 8–16)
AST SERPL-CCNC: 19 U/L (ref 10–40)
BILIRUB SERPL-MCNC: 0.6 MG/DL (ref 0.1–1)
BUN SERPL-MCNC: 18 MG/DL (ref 8–23)
CALCIUM SERPL-MCNC: 9.5 MG/DL (ref 8.7–10.5)
CHLORIDE SERPL-SCNC: 104 MMOL/L (ref 95–110)
CO2 SERPL-SCNC: 25 MMOL/L (ref 23–29)
CREAT SERPL-MCNC: 0.8 MG/DL (ref 0.5–1.4)
EST. GFR  (AFRICAN AMERICAN): >60 ML/MIN/1.73 M^2
EST. GFR  (NON AFRICAN AMERICAN): >60 ML/MIN/1.73 M^2
FSH SERPL-ACNC: 100.5 MIU/ML
GLUCOSE SERPL-MCNC: 113 MG/DL (ref 70–110)
POTASSIUM SERPL-SCNC: 4.7 MMOL/L (ref 3.5–5.1)
PROLACTIN SERPL IA-MCNC: 19.7 NG/ML (ref 5.2–26.5)
PROT SERPL-MCNC: 7.2 G/DL (ref 6–8.4)
SODIUM SERPL-SCNC: 140 MMOL/L (ref 136–145)
T4 FREE SERPL-MCNC: 1.07 NG/DL (ref 0.71–1.51)
TSH SERPL DL<=0.005 MIU/L-ACNC: 0.88 UIU/ML (ref 0.4–4)

## 2020-10-12 PROCEDURE — 99204 OFFICE O/P NEW MOD 45 MIN: CPT | Mod: S$GLB,,, | Performed by: INTERNAL MEDICINE

## 2020-10-12 PROCEDURE — 83001 ASSAY OF GONADOTROPIN (FSH): CPT

## 2020-10-12 PROCEDURE — 84443 ASSAY THYROID STIM HORMONE: CPT

## 2020-10-12 PROCEDURE — 36415 COLL VENOUS BLD VENIPUNCTURE: CPT

## 2020-10-12 PROCEDURE — 80053 COMPREHEN METABOLIC PANEL: CPT

## 2020-10-12 PROCEDURE — 84439 ASSAY OF FREE THYROXINE: CPT

## 2020-10-12 PROCEDURE — 82024 ASSAY OF ACTH: CPT

## 2020-10-12 PROCEDURE — 99204 PR OFFICE/OUTPT VISIT, NEW, LEVL IV, 45-59 MIN: ICD-10-PCS | Mod: S$GLB,,, | Performed by: INTERNAL MEDICINE

## 2020-10-12 PROCEDURE — 84146 ASSAY OF PROLACTIN: CPT

## 2020-10-12 RX ORDER — DEXAMETHASONE 1 MG/1
TABLET ORAL
Qty: 1 TABLET | Refills: 1 | Status: SHIPPED | OUTPATIENT
Start: 2020-10-12 | End: 2021-10-13 | Stop reason: ALTCHOICE

## 2020-10-12 NOTE — PROGRESS NOTES
"Subjective:      Patient ID: Chhaya Sharp is a 75 y.o. female.    Chief Complaint:  Hypothyroidism and Other Misc (pituitary adenoma)      History of Present Illness  Ms. Sharp presents for evaluation and management of a pituitary adenoma and postablative hypothyroidism. New patient to me.     Has history of hypothyroidism, pituitary adenoma, HTN, and HLP. Has history of lung adenocarcinoma s/p right lower lobe resection and LN dissection in 2018.    Former patient of Dr. An (notes and labs in Care Everywhere).    Was diagnosed with a pituitary microcincidentaloma while undergoing an evaluation for headaches in 2013.   Initial MRI in 3/2013 showed a right sided 0.39 cm pituitary adenoma. Subsequent MRI imaging in 2016 and 2019 showed stability in size.     At her initial visit with Dr. An in 2013 she endorsed some easy bruising, weight gain and proximal muscle weakness. Assessment at that time yielded normal TFT's, IGF-1 and prolactin. Gonadotropins showed an age appropriate elevation. She was noted to have slightly elevated LNSC at that time with a mid normal ACTH. However, all other testing for Cushing's including repeat LNSC and multiple LDDST were normal. Her last lab evaluation was in 3/2019.    Component Name  3/22/2019     <0.03   Cortisol, Saliva     In regards to hypothyroidism:  Received MUHAMMAD ablation in [facility] at [facility] for hyperthyroidism and has been on thyroid hormone since that time.   The patient and her daughter are concerned that she has been on the same dose for 5 years, and she has been having some symptoms with concern that it could be thyroid related.   Has overt fatigue. Reports cold intolerance and chronic constipation. Has gained 20-30 lbs over the past year. No palpitations or tremor.   Her daughter reports that her dose has been as high as 125 mcg in the past.   Her daughter reports that she has been "mean" recently.   Takes thyroid hormone in the morning without " food, but she takes with vitamins (B12, Vit D, folic acid).    Review of Systems   Constitutional: Positive for unexpected weight change.   HENT: Negative for voice change.    Eyes: Negative for visual disturbance.   Respiratory: Negative for shortness of breath.    Cardiovascular: Negative for chest pain.   Gastrointestinal: Negative for abdominal pain.   Endocrine: Positive for cold intolerance.   Genitourinary: Negative for frequency.   Musculoskeletal: Positive for myalgias.   Skin: Negative for rash.       Objective:   Physical Exam  Vitals signs reviewed.   Constitutional:       Appearance: She is well-developed.   HENT:      Head: Normocephalic and atraumatic.      Right Ear: External ear normal.      Left Ear: External ear normal.      Nose: Nose normal.   Neck:      Thyroid: No thyromegaly.      Trachea: No tracheal deviation.   Cardiovascular:      Rate and Rhythm: Normal rate and regular rhythm.      Heart sounds: Normal heart sounds.      Comments: No edema  Pulmonary:      Effort: Pulmonary effort is normal.      Breath sounds: Normal breath sounds.   Abdominal:      General: Bowel sounds are normal.      Palpations: Abdomen is soft.      Tenderness: There is no abdominal tenderness.   Musculoskeletal:      Comments: Normal gait, no cyanosis or clubbing   Skin:     General: Skin is warm and dry.      Findings: No rash.      Comments: No nodules, no ulcers   Neurological:      Mental Status: She is alert and oriented to person, place, and time.      Deep Tendon Reflexes: Reflexes are normal and symmetric.      Comments: Vibration sense intact   Psychiatric:         Judgment: Judgment normal.       BP Readings from Last 3 Encounters:   10/12/20 (!) 140/82   08/06/18 137/80   02/05/18 112/71     Wt Readings from Last 1 Encounters:   10/12/20 0915 79.9 kg (176 lb 4.1 oz)       Body mass index is 30.25 kg/m².    Lab Review:   No results found for: HGBA1C  No results found for: CHOL, HDL, LDLCALC, TRIG,  CHOLHDL  Lab Results   Component Value Date     01/12/2018    K 4.1 01/12/2018     01/12/2018    CO2 28 01/12/2018     (H) 01/12/2018    BUN 14 01/12/2018    CREATININE 0.7 01/12/2018    CALCIUM 9.6 01/12/2018    ANIONGAP 9 01/12/2018    ESTGFRAFRICA >60.0 01/12/2018    EGFRNONAA >60.0 01/12/2018         Assessment and Plan     Pituitary adenoma  --Patient with history of pituitary microincidentaloma since 2013  --Has had serial imaging with MRI x 3 with last MRI in 3/2019 and documenting no change in size over that time  --Has had extensive hormonal workup with Dr. An in the past with all anterior pit hormones being essentially normal  --She did have slightly elevated late night salivary cortisol levels, but follow up testing with multiple 1 mg LDDST and LNSC have been normal  --Will check full ant pituitary hormone panel now  --Will check 1 mg LDDST  --If testing is again normal, she likely does not need follow up for this unless there is a clinical change      Postablative hypothyroidism  --S/p MUHAMMAD ablation in 70's for hyperthyroidism  --Currently on levothyroxine 88 mcg PO daily  --No recent TSH on file  --She is having some symptoms that could be related to hypothyroidism but symptoms are non-specific  --Will check TFT's now and adjust dose if necessary  --Advised her to take vitamins at lunchtime and take thyroid hormone in the morning on an empty stomach  --Goal is for a normal TSH      Labs when able, RTC in one year    Kin Mazariegos M.D. Staff Endocrinology

## 2020-10-12 NOTE — ASSESSMENT & PLAN NOTE
--S/p MUHAMMAD ablation in 70's for hyperthyroidism  --Currently on levothyroxine 88 mcg PO daily  --No recent TSH on file  --She is having some symptoms that could be related to hypothyroidism but symptoms are non-specific  --Will check TFT's now and adjust dose if necessary  --Advised her to take vitamins at lunchtime and take thyroid hormone in the morning on an empty stomach  --Goal is for a normal TSH

## 2020-10-12 NOTE — ASSESSMENT & PLAN NOTE
--Patient with history of pituitary microincidentaloma since 2013  --Has had serial imaging with MRI x 3 with last MRI in 3/2019 and documenting no change in size over that time  --Has had extensive hormonal workup with Dr. An in the past with all anterior pit hormones being essentially normal  --She did have slightly elevated late night salivary cortisol levels, but follow up testing with multiple 1 mg LDDST and LNSC have been normal  --Will check full ant pituitary hormone panel now  --Will check 1 mg LDDST  --If testing is again normal, she likely does not need follow up for this unless there is a clinical change

## 2020-10-13 LAB — ACTH PLAS-MCNC: 8 PG/ML (ref 0–46)

## 2020-10-14 ENCOUNTER — LAB VISIT (OUTPATIENT)
Dept: LAB | Facility: HOSPITAL | Age: 75
End: 2020-10-14
Attending: INTERNAL MEDICINE
Payer: MEDICARE

## 2020-10-14 DIAGNOSIS — D35.2 PITUITARY ADENOMA: ICD-10-CM

## 2020-10-14 DIAGNOSIS — E89.0 POSTABLATIVE HYPOTHYROIDISM: ICD-10-CM

## 2020-10-14 LAB — CORTIS SERPL-MCNC: 5.4 UG/DL (ref 4.3–22.4)

## 2020-10-14 PROCEDURE — 36415 COLL VENOUS BLD VENIPUNCTURE: CPT

## 2020-10-14 PROCEDURE — 82533 TOTAL CORTISOL: CPT

## 2020-10-14 PROCEDURE — 82542 COL CHROMOTOGRAPHY QUAL/QUAN: CPT

## 2020-10-16 ENCOUNTER — PATIENT MESSAGE (OUTPATIENT)
Dept: ENDOCRINOLOGY | Facility: CLINIC | Age: 75
End: 2020-10-16

## 2020-10-16 LAB
IGF-I SERPL-MCNC: 132 NG/ML (ref 34–187)
IGF-I Z-SCORE SERPL: 1.04 SD

## 2020-11-18 LAB — DEXAMETHASONE SERPL-MCNC: NORMAL NG/DL

## 2020-12-07 ENCOUNTER — PATIENT MESSAGE (OUTPATIENT)
Dept: ENDOCRINOLOGY | Facility: CLINIC | Age: 75
End: 2020-12-07

## 2020-12-07 DIAGNOSIS — D35.2 PITUITARY ADENOMA: Primary | ICD-10-CM

## 2020-12-15 ENCOUNTER — TELEPHONE (OUTPATIENT)
Dept: VASCULAR SURGERY | Facility: CLINIC | Age: 75
End: 2020-12-15

## 2020-12-15 NOTE — TELEPHONE ENCOUNTER
----- Message from Arin  sent at 12/15/2020  2:58 PM CST -----  Regarding: results  Contact: daughter  Type: Needs Medical Advice    Who Called:  Lisa Mitchell Call Back Number:     Additional Information: Requesting a call back from Nurse, regarding CT results that were mailed over daughter wants to know if  Looked at them and to give her a call back ,please call back with advice

## 2021-06-24 ENCOUNTER — TELEPHONE (OUTPATIENT)
Dept: VASCULAR SURGERY | Facility: CLINIC | Age: 76
End: 2021-06-24

## 2021-06-25 ENCOUNTER — TELEPHONE (OUTPATIENT)
Dept: VASCULAR SURGERY | Facility: CLINIC | Age: 76
End: 2021-06-25

## 2021-07-07 ENCOUNTER — OFFICE VISIT (OUTPATIENT)
Dept: VASCULAR SURGERY | Facility: CLINIC | Age: 76
End: 2021-07-07
Payer: MEDICARE

## 2021-07-07 VITALS
BODY MASS INDEX: 30.25 KG/M2 | HEIGHT: 64 IN | HEART RATE: 81 BPM | SYSTOLIC BLOOD PRESSURE: 146 MMHG | DIASTOLIC BLOOD PRESSURE: 85 MMHG

## 2021-07-07 DIAGNOSIS — I71.40 ABDOMINAL AORTIC ANEURYSM (AAA) WITHOUT RUPTURE: ICD-10-CM

## 2021-07-07 PROCEDURE — 99999 PR PBB SHADOW E&M-EST. PATIENT-LVL III: ICD-10-PCS | Mod: PBBFAC,,, | Performed by: THORACIC SURGERY (CARDIOTHORACIC VASCULAR SURGERY)

## 2021-07-07 PROCEDURE — 99999 PR PBB SHADOW E&M-EST. PATIENT-LVL III: CPT | Mod: PBBFAC,,, | Performed by: THORACIC SURGERY (CARDIOTHORACIC VASCULAR SURGERY)

## 2021-07-07 PROCEDURE — 99213 OFFICE O/P EST LOW 20 MIN: CPT | Mod: PBBFAC,PO | Performed by: THORACIC SURGERY (CARDIOTHORACIC VASCULAR SURGERY)

## 2021-07-07 PROCEDURE — 99213 PR OFFICE/OUTPT VISIT, EST, LEVL III, 20-29 MIN: ICD-10-PCS | Mod: S$PBB,,, | Performed by: THORACIC SURGERY (CARDIOTHORACIC VASCULAR SURGERY)

## 2021-07-07 PROCEDURE — 99213 OFFICE O/P EST LOW 20 MIN: CPT | Mod: S$PBB,,, | Performed by: THORACIC SURGERY (CARDIOTHORACIC VASCULAR SURGERY)

## 2021-07-07 RX ORDER — OMEPRAZOLE 40 MG/1
40 CAPSULE, DELAYED RELEASE ORAL DAILY PRN
COMMUNITY
Start: 2021-05-24

## 2021-07-07 RX ORDER — ATORVASTATIN CALCIUM 40 MG/1
40 TABLET, FILM COATED ORAL NIGHTLY
COMMUNITY
Start: 2021-06-02

## 2021-07-07 RX ORDER — IRBESARTAN 150 MG/1
150 TABLET ORAL DAILY
COMMUNITY
Start: 2021-06-03

## 2021-07-08 PROBLEM — I71.40 ABDOMINAL AORTIC ANEURYSM (AAA) WITHOUT RUPTURE: Status: ACTIVE | Noted: 2021-07-08

## 2021-07-12 ENCOUNTER — TELEPHONE (OUTPATIENT)
Dept: VASCULAR SURGERY | Facility: CLINIC | Age: 76
End: 2021-07-12

## 2021-07-20 ENCOUNTER — TELEPHONE (OUTPATIENT)
Dept: VASCULAR SURGERY | Facility: CLINIC | Age: 76
End: 2021-07-20

## 2021-07-22 DIAGNOSIS — I71.40 ABDOMINAL AORTIC ANEURYSM (AAA) WITHOUT RUPTURE: Primary | ICD-10-CM

## 2021-07-27 DIAGNOSIS — I71.40 ABDOMINAL AORTIC ANEURYSM WITHOUT RUPTURE: ICD-10-CM

## 2021-07-27 DIAGNOSIS — I71.40 ABDOMINAL AORTIC ANEURYSM (AAA) WITHOUT RUPTURE: Primary | ICD-10-CM

## 2021-07-27 RX ORDER — CHLORHEXIDINE GLUCONATE ORAL RINSE 1.2 MG/ML
10 SOLUTION DENTAL
Status: CANCELLED | OUTPATIENT
Start: 2021-07-27

## 2021-07-27 RX ORDER — LIDOCAINE HYDROCHLORIDE 10 MG/ML
1 INJECTION, SOLUTION EPIDURAL; INFILTRATION; INTRACAUDAL; PERINEURAL ONCE
Status: CANCELLED | OUTPATIENT
Start: 2021-07-27 | End: 2021-07-27

## 2021-07-27 RX ORDER — MUPIROCIN 20 MG/G
OINTMENT TOPICAL
Status: CANCELLED | OUTPATIENT
Start: 2021-07-27

## 2021-08-09 ENCOUNTER — TELEPHONE (OUTPATIENT)
Dept: VASCULAR SURGERY | Facility: CLINIC | Age: 76
End: 2021-08-09

## 2021-09-24 DIAGNOSIS — I71.40 ABDOMINAL AORTIC ANEURYSM (AAA) WITHOUT RUPTURE: Primary | ICD-10-CM

## 2021-09-27 DIAGNOSIS — I71.40 ABDOMINAL AORTIC ANEURYSM WITHOUT RUPTURE: Primary | ICD-10-CM

## 2021-10-13 ENCOUNTER — HOSPITAL ENCOUNTER (OUTPATIENT)
Dept: PREADMISSION TESTING | Facility: HOSPITAL | Age: 76
Discharge: HOME | End: 2021-10-13
Attending: THORACIC SURGERY (CARDIOTHORACIC VASCULAR SURGERY)
Payer: MEDICARE

## 2021-10-13 ENCOUNTER — HOSPITAL ENCOUNTER (OUTPATIENT)
Dept: RADIOLOGY | Facility: HOSPITAL | Age: 76
Discharge: HOME | End: 2021-10-13
Attending: THORACIC SURGERY (CARDIOTHORACIC VASCULAR SURGERY)
Payer: MEDICARE

## 2021-10-13 VITALS
HEART RATE: 99 BPM | BODY MASS INDEX: 29.16 KG/M2 | HEIGHT: 65 IN | TEMPERATURE: 98 F | RESPIRATION RATE: 18 BRPM | OXYGEN SATURATION: 94 % | DIASTOLIC BLOOD PRESSURE: 52 MMHG | SYSTOLIC BLOOD PRESSURE: 87 MMHG | WEIGHT: 175 LBS

## 2021-10-13 DIAGNOSIS — I71.40 ABDOMINAL AORTIC ANEURYSM WITHOUT RUPTURE: ICD-10-CM

## 2021-10-13 DIAGNOSIS — Z01.818 PRE-OP TESTING: Primary | ICD-10-CM

## 2021-10-13 LAB
ABO + RH BLD: NORMAL
ANION GAP SERPL CALC-SCNC: 9 MMOL/L (ref 8–16)
BASOPHILS # BLD AUTO: 0.04 K/UL (ref 0–0.2)
BASOPHILS NFR BLD: 0.6 % (ref 0–1.9)
BLD GP AB SCN CELLS X3 SERPL QL: NORMAL
BUN SERPL-MCNC: 20 MG/DL (ref 8–23)
CALCIUM SERPL-MCNC: 9.3 MG/DL (ref 8.7–10.5)
CHLORIDE SERPL-SCNC: 105 MMOL/L (ref 95–110)
CO2 SERPL-SCNC: 24 MMOL/L (ref 23–29)
CREAT SERPL-MCNC: 1.1 MG/DL (ref 0.5–1.4)
DIFFERENTIAL METHOD: ABNORMAL
EOSINOPHIL # BLD AUTO: 0.1 K/UL (ref 0–0.5)
EOSINOPHIL NFR BLD: 1.7 % (ref 0–8)
ERYTHROCYTE [DISTWIDTH] IN BLOOD BY AUTOMATED COUNT: 12.5 % (ref 11.5–14.5)
EST. GFR  (AFRICAN AMERICAN): 56.4 ML/MIN/1.73 M^2
EST. GFR  (NON AFRICAN AMERICAN): 48.9 ML/MIN/1.73 M^2
GLUCOSE SERPL-MCNC: 271 MG/DL (ref 70–110)
HCT VFR BLD AUTO: 42.4 % (ref 37–48.5)
HGB BLD-MCNC: 13.5 G/DL (ref 12–16)
IMM GRANULOCYTES # BLD AUTO: 0.01 K/UL (ref 0–0.04)
IMM GRANULOCYTES NFR BLD AUTO: 0.1 % (ref 0–0.5)
LYMPHOCYTES # BLD AUTO: 2.4 K/UL (ref 1–4.8)
LYMPHOCYTES NFR BLD: 33.2 % (ref 18–48)
MCH RBC QN AUTO: 29.9 PG (ref 27–31)
MCHC RBC AUTO-ENTMCNC: 31.8 G/DL (ref 32–36)
MCV RBC AUTO: 94 FL (ref 82–98)
MONOCYTES # BLD AUTO: 0.6 K/UL (ref 0.3–1)
MONOCYTES NFR BLD: 8.1 % (ref 4–15)
NEUTROPHILS # BLD AUTO: 4.1 K/UL (ref 1.8–7.7)
NEUTROPHILS NFR BLD: 56.3 % (ref 38–73)
NRBC BLD-RTO: 0 /100 WBC
PLATELET # BLD AUTO: 240 K/UL (ref 150–450)
PMV BLD AUTO: 10.6 FL (ref 9.2–12.9)
POTASSIUM SERPL-SCNC: 3.8 MMOL/L (ref 3.5–5.1)
RBC # BLD AUTO: 4.52 M/UL (ref 4–5.4)
SARS-COV-2 RDRP RESP QL NAA+PROBE: NEGATIVE
SODIUM SERPL-SCNC: 138 MMOL/L (ref 136–145)
WBC # BLD AUTO: 7.26 K/UL (ref 3.9–12.7)

## 2021-10-13 PROCEDURE — 36415 COLL VENOUS BLD VENIPUNCTURE: CPT | Performed by: THORACIC SURGERY (CARDIOTHORACIC VASCULAR SURGERY)

## 2021-10-13 PROCEDURE — U0002 COVID-19 LAB TEST NON-CDC: HCPCS | Performed by: THORACIC SURGERY (CARDIOTHORACIC VASCULAR SURGERY)

## 2021-10-13 PROCEDURE — 93010 ELECTROCARDIOGRAM REPORT: CPT | Mod: ,,, | Performed by: INTERNAL MEDICINE

## 2021-10-13 PROCEDURE — 86900 BLOOD TYPING SEROLOGIC ABO: CPT | Performed by: THORACIC SURGERY (CARDIOTHORACIC VASCULAR SURGERY)

## 2021-10-13 PROCEDURE — 80048 BASIC METABOLIC PNL TOTAL CA: CPT | Performed by: THORACIC SURGERY (CARDIOTHORACIC VASCULAR SURGERY)

## 2021-10-13 PROCEDURE — 71046 X-RAY EXAM CHEST 2 VIEWS: CPT | Mod: TC

## 2021-10-13 PROCEDURE — 86920 COMPATIBILITY TEST SPIN: CPT | Mod: 59 | Performed by: THORACIC SURGERY (CARDIOTHORACIC VASCULAR SURGERY)

## 2021-10-13 PROCEDURE — 93005 ELECTROCARDIOGRAM TRACING: CPT | Performed by: INTERNAL MEDICINE

## 2021-10-13 PROCEDURE — 85025 COMPLETE CBC W/AUTO DIFF WBC: CPT | Performed by: THORACIC SURGERY (CARDIOTHORACIC VASCULAR SURGERY)

## 2021-10-13 PROCEDURE — 93010 EKG 12-LEAD: ICD-10-PCS | Mod: ,,, | Performed by: INTERNAL MEDICINE

## 2021-10-14 ENCOUNTER — ANESTHESIA EVENT (OUTPATIENT)
Dept: SURGERY | Facility: HOSPITAL | Age: 76
DRG: 272 | End: 2021-10-14
Payer: MEDICARE

## 2021-10-15 ENCOUNTER — ANESTHESIA (OUTPATIENT)
Dept: SURGERY | Facility: HOSPITAL | Age: 76
DRG: 272 | End: 2021-10-15
Payer: MEDICARE

## 2021-10-15 LAB
BLD PROD TYP BPU: NORMAL
BLD PROD TYP BPU: NORMAL
BLOOD UNIT EXPIRATION DATE: NORMAL
BLOOD UNIT EXPIRATION DATE: NORMAL
BLOOD UNIT TYPE CODE: 5100
BLOOD UNIT TYPE CODE: 5100
BLOOD UNIT TYPE: NORMAL
BLOOD UNIT TYPE: NORMAL
CODING SYSTEM: NORMAL
CODING SYSTEM: NORMAL
DISPENSE STATUS: NORMAL
DISPENSE STATUS: NORMAL
NUM UNITS TRANS PACKED RBC: NORMAL
NUM UNITS TRANS PACKED RBC: NORMAL

## 2021-10-21 DIAGNOSIS — I71.40 ABDOMINAL AORTIC ANEURYSM WITHOUT RUPTURE: Primary | ICD-10-CM

## 2021-10-22 ENCOUNTER — TELEPHONE (OUTPATIENT)
Dept: VASCULAR SURGERY | Facility: CLINIC | Age: 76
End: 2021-10-22

## 2021-11-03 ENCOUNTER — HOSPITAL ENCOUNTER (OUTPATIENT)
Dept: PREADMISSION TESTING | Facility: HOSPITAL | Age: 76
Discharge: HOME | DRG: 272 | End: 2021-11-03
Attending: THORACIC SURGERY (CARDIOTHORACIC VASCULAR SURGERY)
Payer: MEDICARE

## 2021-11-03 VITALS
DIASTOLIC BLOOD PRESSURE: 88 MMHG | HEART RATE: 77 BPM | WEIGHT: 175 LBS | RESPIRATION RATE: 17 BRPM | HEIGHT: 65 IN | OXYGEN SATURATION: 97 % | TEMPERATURE: 98 F | BODY MASS INDEX: 29.16 KG/M2 | SYSTOLIC BLOOD PRESSURE: 148 MMHG

## 2021-11-03 DIAGNOSIS — Z01.818 PRE-OP TESTING: Primary | ICD-10-CM

## 2021-11-03 DIAGNOSIS — I71.40 ABDOMINAL AORTIC ANEURYSM WITHOUT RUPTURE: ICD-10-CM

## 2021-11-03 LAB
ABO + RH BLD: NORMAL
ANION GAP SERPL CALC-SCNC: 6 MMOL/L (ref 8–16)
BASOPHILS # BLD AUTO: 0.06 K/UL (ref 0–0.2)
BASOPHILS NFR BLD: 0.8 % (ref 0–1.9)
BLD GP AB SCN CELLS X3 SERPL QL: NORMAL
BUN SERPL-MCNC: 17 MG/DL (ref 8–23)
CALCIUM SERPL-MCNC: 9.7 MG/DL (ref 8.7–10.5)
CHLORIDE SERPL-SCNC: 110 MMOL/L (ref 95–110)
CO2 SERPL-SCNC: 29 MMOL/L (ref 23–29)
CREAT SERPL-MCNC: 0.7 MG/DL (ref 0.5–1.4)
DIFFERENTIAL METHOD: ABNORMAL
EOSINOPHIL # BLD AUTO: 0.4 K/UL (ref 0–0.5)
EOSINOPHIL NFR BLD: 5.7 % (ref 0–8)
ERYTHROCYTE [DISTWIDTH] IN BLOOD BY AUTOMATED COUNT: 12.6 % (ref 11.5–14.5)
EST. GFR  (AFRICAN AMERICAN): >60 ML/MIN/1.73 M^2
EST. GFR  (NON AFRICAN AMERICAN): >60 ML/MIN/1.73 M^2
GLUCOSE SERPL-MCNC: 108 MG/DL (ref 70–110)
HCT VFR BLD AUTO: 40.9 % (ref 37–48.5)
HGB BLD-MCNC: 13 G/DL (ref 12–16)
IMM GRANULOCYTES # BLD AUTO: 0.02 K/UL (ref 0–0.04)
IMM GRANULOCYTES NFR BLD AUTO: 0.3 % (ref 0–0.5)
LYMPHOCYTES # BLD AUTO: 2.5 K/UL (ref 1–4.8)
LYMPHOCYTES NFR BLD: 34 % (ref 18–48)
MCH RBC QN AUTO: 29.7 PG (ref 27–31)
MCHC RBC AUTO-ENTMCNC: 31.8 G/DL (ref 32–36)
MCV RBC AUTO: 94 FL (ref 82–98)
MONOCYTES # BLD AUTO: 0.7 K/UL (ref 0.3–1)
MONOCYTES NFR BLD: 9 % (ref 4–15)
NEUTROPHILS # BLD AUTO: 3.7 K/UL (ref 1.8–7.7)
NEUTROPHILS NFR BLD: 50.2 % (ref 38–73)
NRBC BLD-RTO: 0 /100 WBC
PLATELET # BLD AUTO: 252 K/UL (ref 150–450)
PMV BLD AUTO: 10.9 FL (ref 9.2–12.9)
POTASSIUM SERPL-SCNC: 4.5 MMOL/L (ref 3.5–5.1)
RBC # BLD AUTO: 4.37 M/UL (ref 4–5.4)
SARS-COV-2 RDRP RESP QL NAA+PROBE: NEGATIVE
SODIUM SERPL-SCNC: 145 MMOL/L (ref 136–145)
WBC # BLD AUTO: 7.35 K/UL (ref 3.9–12.7)

## 2021-11-03 PROCEDURE — 85025 COMPLETE CBC W/AUTO DIFF WBC: CPT | Performed by: THORACIC SURGERY (CARDIOTHORACIC VASCULAR SURGERY)

## 2021-11-03 PROCEDURE — 36415 COLL VENOUS BLD VENIPUNCTURE: CPT | Performed by: THORACIC SURGERY (CARDIOTHORACIC VASCULAR SURGERY)

## 2021-11-03 PROCEDURE — 80048 BASIC METABOLIC PNL TOTAL CA: CPT | Performed by: THORACIC SURGERY (CARDIOTHORACIC VASCULAR SURGERY)

## 2021-11-03 PROCEDURE — 86900 BLOOD TYPING SEROLOGIC ABO: CPT | Performed by: THORACIC SURGERY (CARDIOTHORACIC VASCULAR SURGERY)

## 2021-11-03 PROCEDURE — 86920 COMPATIBILITY TEST SPIN: CPT | Performed by: THORACIC SURGERY (CARDIOTHORACIC VASCULAR SURGERY)

## 2021-11-03 PROCEDURE — U0002 COVID-19 LAB TEST NON-CDC: HCPCS | Performed by: THORACIC SURGERY (CARDIOTHORACIC VASCULAR SURGERY)

## 2021-11-05 ENCOUNTER — HOSPITAL ENCOUNTER (INPATIENT)
Facility: HOSPITAL | Age: 76
LOS: 1 days | Discharge: HOME | DRG: 272 | End: 2021-11-06
Attending: THORACIC SURGERY (CARDIOTHORACIC VASCULAR SURGERY) | Admitting: THORACIC SURGERY (CARDIOTHORACIC VASCULAR SURGERY)
Payer: MEDICARE

## 2021-11-05 DIAGNOSIS — I71.40 ABDOMINAL AORTIC ANEURYSM WITHOUT RUPTURE: ICD-10-CM

## 2021-11-05 LAB
ANION GAP SERPL CALC-SCNC: 6 MMOL/L (ref 8–16)
BUN SERPL-MCNC: 16 MG/DL (ref 8–23)
CALCIUM SERPL-MCNC: 8 MG/DL (ref 8.7–10.5)
CHLORIDE SERPL-SCNC: 104 MMOL/L (ref 95–110)
CO2 SERPL-SCNC: 27 MMOL/L (ref 23–29)
CREAT SERPL-MCNC: 0.6 MG/DL (ref 0.5–1.4)
EST. GFR  (AFRICAN AMERICAN): >60 ML/MIN/1.73 M^2
EST. GFR  (NON AFRICAN AMERICAN): >60 ML/MIN/1.73 M^2
GLUCOSE SERPL-MCNC: 147 MG/DL (ref 70–110)
GLUCOSE SERPL-MCNC: 163 MG/DL (ref 70–110)
GLUCOSE SERPL-MCNC: 204 MG/DL (ref 70–110)
GLUCOSE SERPL-MCNC: 208 MG/DL (ref 70–110)
GLUCOSE SERPL-MCNC: 232 MG/DL (ref 70–110)
HCO3 UR-SCNC: 27.2 MMOL/L (ref 24–28)
HCO3 UR-SCNC: 27.9 MMOL/L (ref 24–28)
HCO3 UR-SCNC: 28.4 MMOL/L (ref 24–28)
HCO3 UR-SCNC: 29.2 MMOL/L (ref 24–28)
HCT VFR BLD AUTO: 29.9 % (ref 37–48.5)
HCT VFR BLD CALC: 28 %PCV (ref 36–54)
HCT VFR BLD CALC: 28 %PCV (ref 36–54)
HCT VFR BLD CALC: 29 %PCV (ref 36–54)
HCT VFR BLD CALC: 35 %PCV (ref 36–54)
HGB BLD-MCNC: 9.7 G/DL (ref 12–16)
PCO2 BLDA: 34.9 MMHG (ref 35–45)
PCO2 BLDA: 35.6 MMHG (ref 35–45)
PCO2 BLDA: 42.4 MMHG (ref 35–45)
PCO2 BLDA: 43.5 MMHG (ref 35–45)
PH SMN: 7.4 [PH] (ref 7.35–7.45)
PH SMN: 7.43 [PH] (ref 7.35–7.45)
PH SMN: 7.5 [PH] (ref 7.35–7.45)
PH SMN: 7.53 [PH] (ref 7.35–7.45)
PO2 BLDA: 330 MMHG (ref 80–100)
PO2 BLDA: 410 MMHG (ref 80–100)
PO2 BLDA: 420 MMHG (ref 80–100)
PO2 BLDA: 443 MMHG (ref 80–100)
POC ACTIVATED CLOTTING TIME K: 120 SEC (ref 74–137)
POC ACTIVATED CLOTTING TIME K: 202 SEC (ref 74–137)
POC ACTIVATED CLOTTING TIME K: 208 SEC (ref 74–137)
POC ACTIVATED CLOTTING TIME K: 252 SEC (ref 74–137)
POC ACTIVATED CLOTTING TIME K: 263 SEC (ref 74–137)
POC ACTIVATED CLOTTING TIME K: 301 SEC (ref 74–137)
POC BE: 2 MMOL/L
POC BE: 4 MMOL/L
POC BE: 5 MMOL/L
POC BE: 7 MMOL/L
POC IONIZED CALCIUM: 1.11 MMOL/L (ref 1.06–1.42)
POC IONIZED CALCIUM: 1.15 MMOL/L (ref 1.06–1.42)
POC SATURATED O2: 100 % (ref 95–100)
POC TCO2: 28 MMOL/L (ref 23–27)
POC TCO2: 29 MMOL/L (ref 23–27)
POC TCO2: 30 MMOL/L (ref 23–27)
POC TCO2: 30 MMOL/L (ref 23–27)
POTASSIUM BLD-SCNC: 3.6 MMOL/L (ref 3.5–5.1)
POTASSIUM BLD-SCNC: 3.7 MMOL/L (ref 3.5–5.1)
POTASSIUM BLD-SCNC: 3.8 MMOL/L (ref 3.5–5.1)
POTASSIUM BLD-SCNC: 4 MMOL/L (ref 3.5–5.1)
POTASSIUM SERPL-SCNC: 3.7 MMOL/L (ref 3.5–5.1)
SAMPLE: ABNORMAL
SAMPLE: NORMAL
SODIUM BLD-SCNC: 135 MMOL/L (ref 136–145)
SODIUM BLD-SCNC: 136 MMOL/L (ref 136–145)
SODIUM BLD-SCNC: 136 MMOL/L (ref 136–145)
SODIUM BLD-SCNC: 137 MMOL/L (ref 136–145)
SODIUM SERPL-SCNC: 137 MMOL/L (ref 136–145)

## 2021-11-05 PROCEDURE — 25000003 PHARM REV CODE 250: Performed by: ANESTHESIOLOGY

## 2021-11-05 PROCEDURE — 80048 BASIC METABOLIC PNL TOTAL CA: CPT | Performed by: THORACIC SURGERY (CARDIOTHORACIC VASCULAR SURGERY)

## 2021-11-05 PROCEDURE — 63600175 PHARM REV CODE 636 W HCPCS: Performed by: ANESTHESIOLOGY

## 2021-11-05 PROCEDURE — 27000221 HC OXYGEN, UP TO 24 HOURS

## 2021-11-05 PROCEDURE — C1894 INTRO/SHEATH, NON-LASER: HCPCS | Performed by: THORACIC SURGERY (CARDIOTHORACIC VASCULAR SURGERY)

## 2021-11-05 PROCEDURE — 63600175 PHARM REV CODE 636 W HCPCS: Performed by: THORACIC SURGERY (CARDIOTHORACIC VASCULAR SURGERY)

## 2021-11-05 PROCEDURE — 27202107 HC XP QUATRO SENSOR: Performed by: ANESTHESIOLOGY

## 2021-11-05 PROCEDURE — 37000008 HC ANESTHESIA 1ST 15 MINUTES: Performed by: THORACIC SURGERY (CARDIOTHORACIC VASCULAR SURGERY)

## 2021-11-05 PROCEDURE — C1725 CATH, TRANSLUMIN NON-LASER: HCPCS | Performed by: THORACIC SURGERY (CARDIOTHORACIC VASCULAR SURGERY)

## 2021-11-05 PROCEDURE — 27000284 HC CANNULA NASAL: Performed by: ANESTHESIOLOGY

## 2021-11-05 PROCEDURE — 27202163 HC CATH MULTI LUMEN: Performed by: ANESTHESIOLOGY

## 2021-11-05 PROCEDURE — 27000658 HC ARTERIAL LINE ALL: Performed by: ANESTHESIOLOGY

## 2021-11-05 PROCEDURE — 63600175 PHARM REV CODE 636 W HCPCS

## 2021-11-05 PROCEDURE — 27201423 OPTIME MED/SURG SUP & DEVICES STERILE SUPPLY: Performed by: THORACIC SURGERY (CARDIOTHORACIC VASCULAR SURGERY)

## 2021-11-05 PROCEDURE — C9248 INJ, CLEVIDIPINE BUTYRATE: HCPCS | Mod: JG | Performed by: NURSE ANESTHETIST, CERTIFIED REGISTERED

## 2021-11-05 PROCEDURE — 25000003 PHARM REV CODE 250: Performed by: NURSE ANESTHETIST, CERTIFIED REGISTERED

## 2021-11-05 PROCEDURE — 34812 OPN FEM ART EXPOS: CPT | Mod: 50,,, | Performed by: THORACIC SURGERY (CARDIOTHORACIC VASCULAR SURGERY)

## 2021-11-05 PROCEDURE — 34709 PR PLACEMENT, EXTN PROSTHESIS, ENDOVASC REPAIR: ICD-10-PCS | Mod: ,,, | Performed by: THORACIC SURGERY (CARDIOTHORACIC VASCULAR SURGERY)

## 2021-11-05 PROCEDURE — 25000003 PHARM REV CODE 250

## 2021-11-05 PROCEDURE — 27000673 HC TUBING BLOOD Y: Performed by: ANESTHESIOLOGY

## 2021-11-05 PROCEDURE — 25500020 PHARM REV CODE 255: Performed by: THORACIC SURGERY (CARDIOTHORACIC VASCULAR SURGERY)

## 2021-11-05 PROCEDURE — C9248 INJ, CLEVIDIPINE BUTYRATE: HCPCS | Mod: JG

## 2021-11-05 PROCEDURE — 85014 HEMATOCRIT: CPT | Performed by: THORACIC SURGERY (CARDIOTHORACIC VASCULAR SURGERY)

## 2021-11-05 PROCEDURE — 63600175 PHARM REV CODE 636 W HCPCS: Performed by: NURSE ANESTHETIST, CERTIFIED REGISTERED

## 2021-11-05 PROCEDURE — 85018 HEMOGLOBIN: CPT | Performed by: THORACIC SURGERY (CARDIOTHORACIC VASCULAR SURGERY)

## 2021-11-05 PROCEDURE — C1769 GUIDE WIRE: HCPCS | Performed by: THORACIC SURGERY (CARDIOTHORACIC VASCULAR SURGERY)

## 2021-11-05 PROCEDURE — 34705 EVAC RPR A-BIILIAC NDGFT: CPT | Mod: 22,,, | Performed by: THORACIC SURGERY (CARDIOTHORACIC VASCULAR SURGERY)

## 2021-11-05 PROCEDURE — 27000676 HC TUBING PRIMARY PLUMSET: Performed by: ANESTHESIOLOGY

## 2021-11-05 PROCEDURE — 36000707: Performed by: THORACIC SURGERY (CARDIOTHORACIC VASCULAR SURGERY)

## 2021-11-05 PROCEDURE — 27202177 HC INTRODUCER, TRACHEAL TUBE: Performed by: ANESTHESIOLOGY

## 2021-11-05 PROCEDURE — 36620 INSERTION CATHETER ARTERY: CPT | Performed by: ANESTHESIOLOGY

## 2021-11-05 PROCEDURE — 34812 PR AAA REPR,EXPOSE FEMORAL ART,GROIN INCIS: ICD-10-PCS | Mod: 50,,, | Performed by: THORACIC SURGERY (CARDIOTHORACIC VASCULAR SURGERY)

## 2021-11-05 PROCEDURE — 34705 PR REPAIR, ENDOVASC, AORTO-BI-ILIAC ENDOGRAFT: ICD-10-PCS | Mod: 22,,, | Performed by: THORACIC SURGERY (CARDIOTHORACIC VASCULAR SURGERY)

## 2021-11-05 PROCEDURE — C1876 STENT, NON-COA/NON-COV W/DEL: HCPCS | Performed by: THORACIC SURGERY (CARDIOTHORACIC VASCULAR SURGERY)

## 2021-11-05 PROCEDURE — 36556 INSERT NON-TUNNEL CV CATH: CPT | Performed by: ANESTHESIOLOGY

## 2021-11-05 PROCEDURE — 25000003 PHARM REV CODE 250: Performed by: THORACIC SURGERY (CARDIOTHORACIC VASCULAR SURGERY)

## 2021-11-05 PROCEDURE — 27800903 OPTIME MED/SURG SUP & DEVICES OTHER IMPLANTS: Performed by: THORACIC SURGERY (CARDIOTHORACIC VASCULAR SURGERY)

## 2021-11-05 PROCEDURE — 37000009 HC ANESTHESIA EA ADD 15 MINS: Performed by: THORACIC SURGERY (CARDIOTHORACIC VASCULAR SURGERY)

## 2021-11-05 PROCEDURE — 27100025 HC TUBING, SET FLUID WARMER: Performed by: ANESTHESIOLOGY

## 2021-11-05 PROCEDURE — 71000039 HC RECOVERY, EACH ADD'L HOUR: Performed by: THORACIC SURGERY (CARDIOTHORACIC VASCULAR SURGERY)

## 2021-11-05 PROCEDURE — 36000706: Performed by: THORACIC SURGERY (CARDIOTHORACIC VASCULAR SURGERY)

## 2021-11-05 PROCEDURE — 27000671 HC TUBING MICROBORE EXT: Performed by: ANESTHESIOLOGY

## 2021-11-05 PROCEDURE — 71000033 HC RECOVERY, INTIAL HOUR: Performed by: THORACIC SURGERY (CARDIOTHORACIC VASCULAR SURGERY)

## 2021-11-05 PROCEDURE — 94761 N-INVAS EAR/PLS OXIMETRY MLT: CPT

## 2021-11-05 PROCEDURE — 34709 PLMT XTN PROSTH EVASC RPR: CPT | Mod: ,,, | Performed by: THORACIC SURGERY (CARDIOTHORACIC VASCULAR SURGERY)

## 2021-11-05 PROCEDURE — 20000000 HC ICU ROOM

## 2021-11-05 DEVICE — IMPLANTABLE DEVICE: Type: IMPLANTABLE DEVICE | Site: AORTA | Status: FUNCTIONAL

## 2021-11-05 RX ORDER — INDOMETHACIN 25 MG/1
CAPSULE ORAL
Status: DISCONTINUED | OUTPATIENT
Start: 2021-11-05 | End: 2021-11-05

## 2021-11-05 RX ORDER — SODIUM CHLORIDE, SODIUM LACTATE, POTASSIUM CHLORIDE, CALCIUM CHLORIDE 600; 310; 30; 20 MG/100ML; MG/100ML; MG/100ML; MG/100ML
INJECTION, SOLUTION INTRAVENOUS CONTINUOUS PRN
Status: DISCONTINUED | OUTPATIENT
Start: 2021-11-05 | End: 2021-11-05

## 2021-11-05 RX ORDER — HEPARIN SODIUM 1000 [USP'U]/ML
INJECTION, SOLUTION INTRAVENOUS; SUBCUTANEOUS
Status: DISCONTINUED | OUTPATIENT
Start: 2021-11-05 | End: 2021-11-05 | Stop reason: HOSPADM

## 2021-11-05 RX ORDER — LIDOCAINE HYDROCHLORIDE 40 MG/ML
SOLUTION TOPICAL
Status: DISCONTINUED | OUTPATIENT
Start: 2021-11-05 | End: 2021-11-05

## 2021-11-05 RX ORDER — ACETAMINOPHEN 10 MG/ML
INJECTION, SOLUTION INTRAVENOUS
Status: DISCONTINUED | OUTPATIENT
Start: 2021-11-05 | End: 2021-11-05

## 2021-11-05 RX ORDER — HEPARIN SODIUM 5000 [USP'U]/ML
INJECTION, SOLUTION INTRAVENOUS; SUBCUTANEOUS
Status: DISCONTINUED | OUTPATIENT
Start: 2021-11-05 | End: 2021-11-05 | Stop reason: HOSPADM

## 2021-11-05 RX ORDER — ROCURONIUM BROMIDE 10 MG/ML
INJECTION, SOLUTION INTRAVENOUS
Status: DISCONTINUED | OUTPATIENT
Start: 2021-11-05 | End: 2021-11-05

## 2021-11-05 RX ORDER — ONDANSETRON 2 MG/ML
4 INJECTION INTRAMUSCULAR; INTRAVENOUS EVERY 12 HOURS PRN
Status: DISCONTINUED | OUTPATIENT
Start: 2021-11-05 | End: 2021-11-06 | Stop reason: HOSPADM

## 2021-11-05 RX ORDER — HYDROCODONE BITARTRATE AND ACETAMINOPHEN 5; 325 MG/1; MG/1
1 TABLET ORAL EVERY 4 HOURS PRN
Status: DISCONTINUED | OUTPATIENT
Start: 2021-11-05 | End: 2021-11-06 | Stop reason: HOSPADM

## 2021-11-05 RX ORDER — CHLORHEXIDINE GLUCONATE ORAL RINSE 1.2 MG/ML
15 SOLUTION DENTAL 2 TIMES DAILY
Status: DISCONTINUED | OUTPATIENT
Start: 2021-11-05 | End: 2021-11-06 | Stop reason: HOSPADM

## 2021-11-05 RX ORDER — SODIUM CHLORIDE 9 MG/ML
INJECTION, SOLUTION INTRAVENOUS CONTINUOUS
Status: DISCONTINUED | OUTPATIENT
Start: 2021-11-05 | End: 2021-11-06 | Stop reason: HOSPADM

## 2021-11-05 RX ORDER — ESMOLOL HYDROCHLORIDE 10 MG/ML
INJECTION INTRAVENOUS
Status: DISCONTINUED | OUTPATIENT
Start: 2021-11-05 | End: 2021-11-05

## 2021-11-05 RX ORDER — VANCOMYCIN HYDROCHLORIDE 1 G/20ML
INJECTION, POWDER, LYOPHILIZED, FOR SOLUTION INTRAVENOUS
Status: DISCONTINUED | OUTPATIENT
Start: 2021-11-05 | End: 2021-11-05 | Stop reason: HOSPADM

## 2021-11-05 RX ORDER — CEFAZOLIN SODIUM 2 G/50ML
2 SOLUTION INTRAVENOUS
Status: COMPLETED | OUTPATIENT
Start: 2021-11-05 | End: 2021-11-05

## 2021-11-05 RX ORDER — MUPIROCIN 20 MG/G
OINTMENT TOPICAL 2 TIMES DAILY
Status: DISCONTINUED | OUTPATIENT
Start: 2021-11-05 | End: 2021-11-06 | Stop reason: HOSPADM

## 2021-11-05 RX ORDER — MIDAZOLAM HYDROCHLORIDE 1 MG/ML
INJECTION INTRAMUSCULAR; INTRAVENOUS
Status: DISCONTINUED | OUTPATIENT
Start: 2021-11-05 | End: 2021-11-05

## 2021-11-05 RX ORDER — DIPHENHYDRAMINE HYDROCHLORIDE 50 MG/ML
INJECTION INTRAMUSCULAR; INTRAVENOUS
Status: DISCONTINUED | OUTPATIENT
Start: 2021-11-05 | End: 2021-11-05

## 2021-11-05 RX ORDER — PROPOFOL 10 MG/ML
VIAL (ML) INTRAVENOUS
Status: DISCONTINUED | OUTPATIENT
Start: 2021-11-05 | End: 2021-11-05

## 2021-11-05 RX ORDER — GABAPENTIN 100 MG/1
200 CAPSULE ORAL ONCE
Status: DISCONTINUED | OUTPATIENT
Start: 2021-11-05 | End: 2021-11-06 | Stop reason: HOSPADM

## 2021-11-05 RX ORDER — HYDROMORPHONE HYDROCHLORIDE 1 MG/ML
INJECTION, SOLUTION INTRAMUSCULAR; INTRAVENOUS; SUBCUTANEOUS
Status: COMPLETED
Start: 2021-11-05 | End: 2021-11-05

## 2021-11-05 RX ORDER — ESCITALOPRAM OXALATE 10 MG/1
20 TABLET ORAL NIGHTLY
Status: DISCONTINUED | OUTPATIENT
Start: 2021-11-05 | End: 2021-11-06 | Stop reason: HOSPADM

## 2021-11-05 RX ORDER — FAMOTIDINE 10 MG/ML
INJECTION INTRAVENOUS
Status: DISCONTINUED | OUTPATIENT
Start: 2021-11-05 | End: 2021-11-05

## 2021-11-05 RX ORDER — LIDOCAINE HYDROCHLORIDE 20 MG/ML
JELLY TOPICAL
Status: DISCONTINUED | OUTPATIENT
Start: 2021-11-05 | End: 2021-11-05

## 2021-11-05 RX ORDER — FENTANYL CITRATE 50 UG/ML
25 INJECTION, SOLUTION INTRAMUSCULAR; INTRAVENOUS
Status: COMPLETED | OUTPATIENT
Start: 2021-11-05 | End: 2021-11-05

## 2021-11-05 RX ORDER — HYDROMORPHONE HYDROCHLORIDE 1 MG/ML
1 INJECTION, SOLUTION INTRAMUSCULAR; INTRAVENOUS; SUBCUTANEOUS
Status: DISCONTINUED | OUTPATIENT
Start: 2021-11-05 | End: 2021-11-06 | Stop reason: HOSPADM

## 2021-11-05 RX ORDER — PHENYLEPHRINE HYDROCHLORIDE 10 MG/ML
INJECTION INTRAVENOUS
Status: DISCONTINUED | OUTPATIENT
Start: 2021-11-05 | End: 2021-11-05

## 2021-11-05 RX ORDER — ONDANSETRON 2 MG/ML
4 INJECTION INTRAMUSCULAR; INTRAVENOUS DAILY PRN
Status: DISCONTINUED | OUTPATIENT
Start: 2021-11-05 | End: 2021-11-05

## 2021-11-05 RX ORDER — HEPARIN SODIUM 10000 [USP'U]/ML
INJECTION, SOLUTION INTRAVENOUS; SUBCUTANEOUS
Status: DISCONTINUED | OUTPATIENT
Start: 2021-11-05 | End: 2021-11-05

## 2021-11-05 RX ORDER — DIPHENHYDRAMINE HYDROCHLORIDE 50 MG/ML
12.5 INJECTION INTRAMUSCULAR; INTRAVENOUS
Status: DISCONTINUED | OUTPATIENT
Start: 2021-11-05 | End: 2021-11-05

## 2021-11-05 RX ORDER — SUCCINYLCHOLINE CHLORIDE 20 MG/ML
INJECTION INTRAMUSCULAR; INTRAVENOUS
Status: DISCONTINUED | OUTPATIENT
Start: 2021-11-05 | End: 2021-11-05

## 2021-11-05 RX ORDER — OXYCODONE AND ACETAMINOPHEN 10; 325 MG/1; MG/1
1 TABLET ORAL EVERY 4 HOURS PRN
Status: DISCONTINUED | OUTPATIENT
Start: 2021-11-05 | End: 2021-11-06 | Stop reason: HOSPADM

## 2021-11-05 RX ORDER — FENTANYL CITRATE 50 UG/ML
INJECTION, SOLUTION INTRAMUSCULAR; INTRAVENOUS
Status: DISCONTINUED | OUTPATIENT
Start: 2021-11-05 | End: 2021-11-05

## 2021-11-05 RX ORDER — LEVOTHYROXINE SODIUM 88 UG/1
88 TABLET ORAL
Status: DISCONTINUED | OUTPATIENT
Start: 2021-11-06 | End: 2021-11-06 | Stop reason: HOSPADM

## 2021-11-05 RX ORDER — OXYCODONE HYDROCHLORIDE 5 MG/1
5 TABLET ORAL
Status: DISCONTINUED | OUTPATIENT
Start: 2021-11-05 | End: 2021-11-05

## 2021-11-05 RX ORDER — SCOLOPAMINE TRANSDERMAL SYSTEM 1 MG/1
1 PATCH, EXTENDED RELEASE TRANSDERMAL
Status: DISCONTINUED | OUTPATIENT
Start: 2021-11-05 | End: 2021-11-05

## 2021-11-05 RX ORDER — DEXAMETHASONE SODIUM PHOSPHATE 4 MG/ML
INJECTION, SOLUTION INTRA-ARTICULAR; INTRALESIONAL; INTRAMUSCULAR; INTRAVENOUS; SOFT TISSUE
Status: DISCONTINUED | OUTPATIENT
Start: 2021-11-05 | End: 2021-11-05

## 2021-11-05 RX ORDER — ATORVASTATIN CALCIUM 40 MG/1
40 TABLET, FILM COATED ORAL NIGHTLY
Status: DISCONTINUED | OUTPATIENT
Start: 2021-11-05 | End: 2021-11-06 | Stop reason: HOSPADM

## 2021-11-05 RX ORDER — HYDROMORPHONE HYDROCHLORIDE 1 MG/ML
2 INJECTION, SOLUTION INTRAMUSCULAR; INTRAVENOUS; SUBCUTANEOUS
Status: DISCONTINUED | OUTPATIENT
Start: 2021-11-05 | End: 2021-11-05

## 2021-11-05 RX ORDER — LIDOCAINE HYDROCHLORIDE 20 MG/ML
INJECTION, SOLUTION EPIDURAL; INFILTRATION; INTRACAUDAL; PERINEURAL
Status: DISCONTINUED | OUTPATIENT
Start: 2021-11-05 | End: 2021-11-05

## 2021-11-05 RX ORDER — MORPHINE SULFATE 2 MG/ML
2 INJECTION, SOLUTION INTRAMUSCULAR; INTRAVENOUS
Status: DISCONTINUED | OUTPATIENT
Start: 2021-11-05 | End: 2021-11-05

## 2021-11-05 RX ORDER — ONDANSETRON 2 MG/ML
INJECTION INTRAMUSCULAR; INTRAVENOUS
Status: DISCONTINUED | OUTPATIENT
Start: 2021-11-05 | End: 2021-11-05

## 2021-11-05 RX ADMIN — HYDROMORPHONE HYDROCHLORIDE 2 MG: 1 INJECTION, SOLUTION INTRAMUSCULAR; INTRAVENOUS; SUBCUTANEOUS at 05:11

## 2021-11-05 RX ADMIN — FAMOTIDINE 20 MG: 10 INJECTION, SOLUTION INTRAVENOUS at 07:11

## 2021-11-05 RX ADMIN — SODIUM CHLORIDE: 0.9 INJECTION, SOLUTION INTRAVENOUS at 12:11

## 2021-11-05 RX ADMIN — ACETAMINOPHEN 1000 MG: 10 INJECTION, SOLUTION INTRAVENOUS at 07:11

## 2021-11-05 RX ADMIN — ONDANSETRON 4 MG: 2 INJECTION INTRAMUSCULAR; INTRAVENOUS at 07:11

## 2021-11-05 RX ADMIN — ROCURONIUM BROMIDE 45 MG: 10 INJECTION, SOLUTION INTRAVENOUS at 07:11

## 2021-11-05 RX ADMIN — DEXAMETHASONE SODIUM PHOSPHATE 8 MG: 4 INJECTION, SOLUTION INTRAMUSCULAR; INTRAVENOUS at 07:11

## 2021-11-05 RX ADMIN — MORPHINE SULFATE 2 MG: 2 INJECTION, SOLUTION INTRAMUSCULAR; INTRAVENOUS at 03:11

## 2021-11-05 RX ADMIN — ESCITALOPRAM OXALATE 20 MG: 10 TABLET ORAL at 08:11

## 2021-11-05 RX ADMIN — PROPOFOL 90 MG: 10 INJECTION, EMULSION INTRAVENOUS at 07:11

## 2021-11-05 RX ADMIN — SODIUM CHLORIDE, SODIUM LACTATE, POTASSIUM CHLORIDE, AND CALCIUM CHLORIDE: .6; .31; .03; .02 INJECTION, SOLUTION INTRAVENOUS at 06:11

## 2021-11-05 RX ADMIN — PROPOFOL 40 MG: 10 INJECTION, EMULSION INTRAVENOUS at 07:11

## 2021-11-05 RX ADMIN — FENTANYL CITRATE 100 MCG: 50 INJECTION INTRAMUSCULAR; INTRAVENOUS at 06:11

## 2021-11-05 RX ADMIN — SUCCINYLCHOLINE CHLORIDE 140 MG: 20 INJECTION, SOLUTION INTRAMUSCULAR; INTRAVENOUS at 07:11

## 2021-11-05 RX ADMIN — FENTANYL CITRATE 25 MCG: 50 INJECTION, SOLUTION INTRAMUSCULAR; INTRAVENOUS at 12:11

## 2021-11-05 RX ADMIN — HYDROCODONE BITARTRATE AND ACETAMINOPHEN 1 TABLET: 5; 325 TABLET ORAL at 04:11

## 2021-11-05 RX ADMIN — PHENYLEPHRINE HYDROCHLORIDE 100 MCG: 10 INJECTION INTRAVENOUS at 08:11

## 2021-11-05 RX ADMIN — ESMOLOL HYDROCHLORIDE 40 MG: 10 INJECTION, SOLUTION INTRAVENOUS at 09:11

## 2021-11-05 RX ADMIN — SODIUM CHLORIDE: 900 INJECTION INTRAVENOUS at 08:11

## 2021-11-05 RX ADMIN — SODIUM CHLORIDE, SODIUM LACTATE, POTASSIUM CHLORIDE, AND CALCIUM CHLORIDE: .6; .31; .03; .02 INJECTION, SOLUTION INTRAVENOUS at 07:11

## 2021-11-05 RX ADMIN — CLEVIPIDINE 2 MG/HR: 0.5 EMULSION INTRAVENOUS at 07:11

## 2021-11-05 RX ADMIN — MORPHINE SULFATE 2 MG: 2 INJECTION, SOLUTION INTRAMUSCULAR; INTRAVENOUS at 01:11

## 2021-11-05 RX ADMIN — LIDOCAINE HYDROCHLORIDE 5 ML: 20 JELLY TOPICAL at 07:11

## 2021-11-05 RX ADMIN — HEPARIN SODIUM 3000 UNITS: 10000 INJECTION, SOLUTION INTRAVENOUS; SUBCUTANEOUS at 07:11

## 2021-11-05 RX ADMIN — LIDOCAINE HYDROCHLORIDE 100 MG: 20 INJECTION, SOLUTION EPIDURAL; INFILTRATION; INTRACAUDAL; PERINEURAL at 07:11

## 2021-11-05 RX ADMIN — FENTANYL CITRATE 25 MCG: 50 INJECTION, SOLUTION INTRAMUSCULAR; INTRAVENOUS at 11:11

## 2021-11-05 RX ADMIN — HEPARIN SODIUM 7500 UNITS: 10000 INJECTION, SOLUTION INTRAVENOUS; SUBCUTANEOUS at 07:11

## 2021-11-05 RX ADMIN — ATORVASTATIN CALCIUM 40 MG: 40 TABLET, FILM COATED ORAL at 08:11

## 2021-11-05 RX ADMIN — PHENYLEPHRINE HYDROCHLORIDE 100 MCG: 10 INJECTION INTRAVENOUS at 07:11

## 2021-11-05 RX ADMIN — CHLORHEXIDINE GLUCONATE 15 ML: 1.2 RINSE ORAL at 08:11

## 2021-11-05 RX ADMIN — CLEVIPIDINE 25 MG: 0.5 EMULSION INTRAVENOUS at 04:11

## 2021-11-05 RX ADMIN — ROCURONIUM BROMIDE 20 MG: 10 INJECTION, SOLUTION INTRAVENOUS at 08:11

## 2021-11-05 RX ADMIN — SODIUM BICARBONATE 50 MEQ: 84 INJECTION, SOLUTION INTRAVENOUS at 10:11

## 2021-11-05 RX ADMIN — ROCURONIUM BROMIDE 5 MG: 10 INJECTION, SOLUTION INTRAVENOUS at 07:11

## 2021-11-05 RX ADMIN — MIDAZOLAM HYDROCHLORIDE 2 MG: 1 INJECTION, SOLUTION INTRAMUSCULAR; INTRAVENOUS at 06:11

## 2021-11-05 RX ADMIN — LIDOCAINE HYDROCHLORIDE 4 ML: 40 SOLUTION TOPICAL at 07:11

## 2021-11-05 RX ADMIN — DEXTROSE 2 G: 50 INJECTION, SOLUTION INTRAVENOUS at 03:11

## 2021-11-05 RX ADMIN — FENTANYL CITRATE 50 MCG: 50 INJECTION INTRAMUSCULAR; INTRAVENOUS at 09:11

## 2021-11-05 RX ADMIN — SCOPOLAMINE 1 PATCH: 1 PATCH, EXTENDED RELEASE TRANSDERMAL at 06:11

## 2021-11-05 RX ADMIN — DEXTROSE 2 G: 50 INJECTION, SOLUTION INTRAVENOUS at 11:11

## 2021-11-05 RX ADMIN — MUPIROCIN: 20 OINTMENT TOPICAL at 08:11

## 2021-11-05 RX ADMIN — OXYCODONE HYDROCHLORIDE 5 MG: 5 TABLET ORAL at 12:11

## 2021-11-05 RX ADMIN — HEPARIN SODIUM 5000 UNITS: 10000 INJECTION, SOLUTION INTRAVENOUS; SUBCUTANEOUS at 08:11

## 2021-11-05 RX ADMIN — PROPOFOL 70 MG: 10 INJECTION, EMULSION INTRAVENOUS at 07:11

## 2021-11-05 RX ADMIN — CEFAZOLIN SODIUM 2 G: 2 SOLUTION INTRAVENOUS at 07:11

## 2021-11-05 RX ADMIN — SUGAMMADEX 159 MG: 100 INJECTION, SOLUTION INTRAVENOUS at 10:11

## 2021-11-05 RX ADMIN — DIPHENHYDRAMINE HYDROCHLORIDE 6.25 MG: 50 INJECTION, SOLUTION INTRAMUSCULAR; INTRAVENOUS at 07:11

## 2021-11-05 RX ADMIN — SODIUM CHLORIDE: 0.9 INJECTION, SOLUTION INTRAVENOUS at 07:11

## 2021-11-06 VITALS
SYSTOLIC BLOOD PRESSURE: 167 MMHG | BODY MASS INDEX: 29.57 KG/M2 | OXYGEN SATURATION: 92 % | HEIGHT: 65 IN | RESPIRATION RATE: 21 BRPM | DIASTOLIC BLOOD PRESSURE: 83 MMHG | WEIGHT: 177.5 LBS | TEMPERATURE: 99 F | HEART RATE: 100 BPM

## 2021-11-06 LAB
ALBUMIN SERPL BCP-MCNC: 2.9 G/DL (ref 3.5–5.2)
ALP SERPL-CCNC: 68 U/L (ref 55–135)
ALT SERPL W/O P-5'-P-CCNC: 13 U/L (ref 10–44)
ANION GAP SERPL CALC-SCNC: 8 MMOL/L (ref 8–16)
AST SERPL-CCNC: 17 U/L (ref 10–40)
BASOPHILS # BLD AUTO: 0.03 K/UL (ref 0–0.2)
BASOPHILS NFR BLD: 0.2 % (ref 0–1.9)
BILIRUB SERPL-MCNC: 0.7 MG/DL (ref 0.1–1)
BUN SERPL-MCNC: 17 MG/DL (ref 8–23)
CALCIUM SERPL-MCNC: 7.6 MG/DL (ref 8.7–10.5)
CHLORIDE SERPL-SCNC: 106 MMOL/L (ref 95–110)
CO2 SERPL-SCNC: 24 MMOL/L (ref 23–29)
CREAT SERPL-MCNC: 0.6 MG/DL (ref 0.5–1.4)
DIFFERENTIAL METHOD: ABNORMAL
EOSINOPHIL # BLD AUTO: 0 K/UL (ref 0–0.5)
EOSINOPHIL NFR BLD: 0 % (ref 0–8)
ERYTHROCYTE [DISTWIDTH] IN BLOOD BY AUTOMATED COUNT: 12.8 % (ref 11.5–14.5)
EST. GFR  (AFRICAN AMERICAN): >60 ML/MIN/1.73 M^2
EST. GFR  (NON AFRICAN AMERICAN): >60 ML/MIN/1.73 M^2
GLUCOSE SERPL-MCNC: 134 MG/DL (ref 70–110)
HCT VFR BLD AUTO: 23.5 % (ref 37–48.5)
HCT VFR BLD AUTO: 28.3 % (ref 37–48.5)
HGB BLD-MCNC: 7.7 G/DL (ref 12–16)
HGB BLD-MCNC: 9.2 G/DL (ref 12–16)
IMM GRANULOCYTES # BLD AUTO: 0.04 K/UL (ref 0–0.04)
IMM GRANULOCYTES NFR BLD AUTO: 0.3 % (ref 0–0.5)
LYMPHOCYTES # BLD AUTO: 1.9 K/UL (ref 1–4.8)
LYMPHOCYTES NFR BLD: 13.1 % (ref 18–48)
MAGNESIUM SERPL-MCNC: 1.7 MG/DL (ref 1.6–2.6)
MCH RBC QN AUTO: 30.2 PG (ref 27–31)
MCHC RBC AUTO-ENTMCNC: 32.8 G/DL (ref 32–36)
MCV RBC AUTO: 92 FL (ref 82–98)
MONOCYTES # BLD AUTO: 0.8 K/UL (ref 0.3–1)
MONOCYTES NFR BLD: 5.5 % (ref 4–15)
NEUTROPHILS # BLD AUTO: 11.7 K/UL (ref 1.8–7.7)
NEUTROPHILS NFR BLD: 80.9 % (ref 38–73)
NRBC BLD-RTO: 0 /100 WBC
PLATELET # BLD AUTO: 212 K/UL (ref 150–450)
PMV BLD AUTO: 10.8 FL (ref 9.2–12.9)
POTASSIUM SERPL-SCNC: 4.1 MMOL/L (ref 3.5–5.1)
PROT SERPL-MCNC: 5.4 G/DL (ref 6–8.4)
RBC # BLD AUTO: 2.55 M/UL (ref 4–5.4)
SODIUM SERPL-SCNC: 138 MMOL/L (ref 136–145)
WBC # BLD AUTO: 14.4 K/UL (ref 3.9–12.7)

## 2021-11-06 PROCEDURE — 25000003 PHARM REV CODE 250: Performed by: THORACIC SURGERY (CARDIOTHORACIC VASCULAR SURGERY)

## 2021-11-06 PROCEDURE — 83735 ASSAY OF MAGNESIUM: CPT | Performed by: THORACIC SURGERY (CARDIOTHORACIC VASCULAR SURGERY)

## 2021-11-06 PROCEDURE — 85025 COMPLETE CBC W/AUTO DIFF WBC: CPT | Performed by: THORACIC SURGERY (CARDIOTHORACIC VASCULAR SURGERY)

## 2021-11-06 PROCEDURE — 36430 TRANSFUSION BLD/BLD COMPNT: CPT

## 2021-11-06 PROCEDURE — 27000221 HC OXYGEN, UP TO 24 HOURS

## 2021-11-06 PROCEDURE — 94761 N-INVAS EAR/PLS OXIMETRY MLT: CPT

## 2021-11-06 PROCEDURE — 99900035 HC TECH TIME PER 15 MIN (STAT)

## 2021-11-06 PROCEDURE — 80053 COMPREHEN METABOLIC PANEL: CPT | Performed by: THORACIC SURGERY (CARDIOTHORACIC VASCULAR SURGERY)

## 2021-11-06 PROCEDURE — 85018 HEMOGLOBIN: CPT | Performed by: THORACIC SURGERY (CARDIOTHORACIC VASCULAR SURGERY)

## 2021-11-06 PROCEDURE — 63600175 PHARM REV CODE 636 W HCPCS: Performed by: THORACIC SURGERY (CARDIOTHORACIC VASCULAR SURGERY)

## 2021-11-06 PROCEDURE — P9016 RBC LEUKOCYTES REDUCED: HCPCS | Performed by: THORACIC SURGERY (CARDIOTHORACIC VASCULAR SURGERY)

## 2021-11-06 PROCEDURE — 85014 HEMATOCRIT: CPT | Performed by: THORACIC SURGERY (CARDIOTHORACIC VASCULAR SURGERY)

## 2021-11-06 RX ORDER — HYDROCODONE BITARTRATE AND ACETAMINOPHEN 500; 5 MG/1; MG/1
TABLET ORAL
Status: DISCONTINUED | OUTPATIENT
Start: 2021-11-06 | End: 2021-11-06 | Stop reason: HOSPADM

## 2021-11-06 RX ADMIN — HYDROMORPHONE HYDROCHLORIDE 1 MG: 1 INJECTION, SOLUTION INTRAMUSCULAR; INTRAVENOUS; SUBCUTANEOUS at 01:11

## 2021-11-06 RX ADMIN — SODIUM CHLORIDE: 0.9 INJECTION, SOLUTION INTRAVENOUS at 03:11

## 2021-11-06 RX ADMIN — LEVOTHYROXINE SODIUM 88 MCG: 88 TABLET ORAL at 05:11

## 2021-11-06 RX ADMIN — OXYCODONE HYDROCHLORIDE AND ACETAMINOPHEN 1 TABLET: 10; 325 TABLET ORAL at 01:11

## 2021-11-06 RX ADMIN — OXYCODONE HYDROCHLORIDE AND ACETAMINOPHEN 1 TABLET: 10; 325 TABLET ORAL at 07:11

## 2021-11-06 RX ADMIN — HYDROMORPHONE HYDROCHLORIDE 1 MG: 1 INJECTION, SOLUTION INTRAMUSCULAR; INTRAVENOUS; SUBCUTANEOUS at 09:11

## 2021-11-10 ENCOUNTER — TELEPHONE (OUTPATIENT)
Dept: VASCULAR SURGERY | Facility: CLINIC | Age: 76
End: 2021-11-10
Payer: MEDICARE

## 2021-11-14 ENCOUNTER — NURSE TRIAGE (OUTPATIENT)
Dept: ADMINISTRATIVE | Facility: CLINIC | Age: 76
End: 2021-11-14
Payer: MEDICARE

## 2021-11-17 ENCOUNTER — TELEPHONE (OUTPATIENT)
Dept: VASCULAR SURGERY | Facility: CLINIC | Age: 76
End: 2021-11-17
Payer: MEDICARE

## 2021-11-17 PROCEDURE — G0180 PR HOME HEALTH MD CERTIFICATION: ICD-10-PCS | Mod: ,,, | Performed by: THORACIC SURGERY (CARDIOTHORACIC VASCULAR SURGERY)

## 2021-11-17 PROCEDURE — G0180 MD CERTIFICATION HHA PATIENT: HCPCS | Mod: ,,, | Performed by: THORACIC SURGERY (CARDIOTHORACIC VASCULAR SURGERY)

## 2021-11-19 ENCOUNTER — TELEPHONE (OUTPATIENT)
Dept: VASCULAR SURGERY | Facility: CLINIC | Age: 76
End: 2021-11-19
Payer: MEDICARE

## 2021-11-24 ENCOUNTER — OFFICE VISIT (OUTPATIENT)
Dept: VASCULAR SURGERY | Facility: CLINIC | Age: 76
End: 2021-11-24
Payer: MEDICARE

## 2021-11-24 VITALS
HEIGHT: 65 IN | SYSTOLIC BLOOD PRESSURE: 152 MMHG | BODY MASS INDEX: 29.53 KG/M2 | HEART RATE: 78 BPM | DIASTOLIC BLOOD PRESSURE: 83 MMHG

## 2021-11-24 DIAGNOSIS — Z86.79 S/P AAA (ABDOMINAL AORTIC ANEURYSM) REPAIR: ICD-10-CM

## 2021-11-24 DIAGNOSIS — Z98.890 S/P AAA (ABDOMINAL AORTIC ANEURYSM) REPAIR: ICD-10-CM

## 2021-11-24 PROCEDURE — 99213 OFFICE O/P EST LOW 20 MIN: CPT | Mod: PBBFAC,PO | Performed by: THORACIC SURGERY (CARDIOTHORACIC VASCULAR SURGERY)

## 2021-11-24 PROCEDURE — 99999 PR PBB SHADOW E&M-EST. PATIENT-LVL III: CPT | Mod: PBBFAC,,, | Performed by: THORACIC SURGERY (CARDIOTHORACIC VASCULAR SURGERY)

## 2021-11-24 PROCEDURE — 99999 PR PBB SHADOW E&M-EST. PATIENT-LVL III: ICD-10-PCS | Mod: PBBFAC,,, | Performed by: THORACIC SURGERY (CARDIOTHORACIC VASCULAR SURGERY)

## 2021-11-24 PROCEDURE — 99024 POSTOP FOLLOW-UP VISIT: CPT | Mod: POP,,, | Performed by: THORACIC SURGERY (CARDIOTHORACIC VASCULAR SURGERY)

## 2021-11-24 PROCEDURE — 99024 PR POST-OP FOLLOW-UP VISIT: ICD-10-PCS | Mod: POP,,, | Performed by: THORACIC SURGERY (CARDIOTHORACIC VASCULAR SURGERY)

## 2021-11-24 RX ORDER — ALBUTEROL SULFATE 90 UG/1
AEROSOL, METERED RESPIRATORY (INHALATION)
COMMUNITY
Start: 2021-11-15

## 2021-12-10 ENCOUNTER — EXTERNAL HOME HEALTH (OUTPATIENT)
Dept: HOME HEALTH SERVICES | Facility: HOSPITAL | Age: 76
End: 2021-12-10
Payer: MEDICARE

## 2022-01-16 PROCEDURE — G0179 MD RECERTIFICATION HHA PT: HCPCS | Mod: ,,, | Performed by: THORACIC SURGERY (CARDIOTHORACIC VASCULAR SURGERY)

## 2022-01-16 PROCEDURE — G0179 PR HOME HEALTH MD RECERTIFICATION: ICD-10-PCS | Mod: ,,, | Performed by: THORACIC SURGERY (CARDIOTHORACIC VASCULAR SURGERY)

## 2022-01-21 ENCOUNTER — EXTERNAL HOME HEALTH (OUTPATIENT)
Dept: HOME HEALTH SERVICES | Facility: HOSPITAL | Age: 77
End: 2022-01-21
Payer: MEDICARE

## 2024-02-22 NOTE — PLAN OF CARE
Problem: Patient Care Overview  Goal: Plan of Care Review  Outcome: Ongoing (interventions implemented as appropriate)  POC reviewed with Pt and daughter, both verbalized understanding. BP in the 80's most of the shift but patient is asymptomatic; MD aware. Orthostatic BP checks done with PT, see notes. Pain controlled with schedule medications. On 2 L NC, sats 97-99%. NSR on telemetry monitor. Unable to void after spivey d/c, bladder scan performed, see notes. Tolerating regular diet. Free of falls or injuries.       Stable

## (undated) DEVICE — DRAPE IOBAN 23X33 6651EZ

## (undated) DEVICE — SUT 2-0 VICRYL / CT-1

## (undated) DEVICE — SPONGE XRAY DETECTABLE 4X4

## (undated) DEVICE — Device

## (undated) DEVICE — CATH THORACIC 24FR ST

## (undated) DEVICE — DRAPE ABDOMINAL TIBURON 14X11

## (undated) DEVICE — DEVICE TORQUE TD01

## (undated) DEVICE — NDL 18GA X1 1/2 REG BEVEL

## (undated) DEVICE — GLOVE SURG BIOGEL LTX PF ST SZ 6.5

## (undated) DEVICE — ENDO GIA UNIVERSAL 12MM

## (undated) DEVICE — ELECTRODE BLADE W/SLEEVE 2.75

## (undated) DEVICE — SEE MEDLINE ITEM 157131

## (undated) DEVICE — DRESSING TRANS 4X4 TEGADERM

## (undated) DEVICE — DRAPE INCISE IOBAN 2 23X17IN

## (undated) DEVICE — SUT CTD VICRYL 0 UND BR CT

## (undated) DEVICE — SYRINGE 30ML 302832

## (undated) DEVICE — PACK SET UP CONVERTORS

## (undated) DEVICE — SEE MEDLINE ITEM 157117

## (undated) DEVICE — LOOP MINI BLUE 30-713

## (undated) DEVICE — SYS LABEL CORRECT MED

## (undated) DEVICE — SEE MEDLINE ITEM 146420

## (undated) DEVICE — SYR 30CC LUER LOCK

## (undated) DEVICE — SUTURE PDS PLUS 0 L60IN ABSORBABLE VIOLET CTX L48MM 1/2 CIRC

## (undated) DEVICE — TRAY SKIN PREP DRY

## (undated) DEVICE — ELECTRODE BLADE INSULATED 1 IN

## (undated) DEVICE — SOLUTION IRRI NS BOTTLE 1000ML R5200-01

## (undated) DEVICE — CLIP APPLIER MCS20 STERILMED ETHMCS20

## (undated) DEVICE — DRAPE C-ARM (FITS NEW C-ARM) 07-CA108

## (undated) DEVICE — SYR SLIP TIP 20CC

## (undated) DEVICE — SOLUTION PREP IODINE 4OZ

## (undated) DEVICE — SUT VICRYL 3-0 27 SH

## (undated) DEVICE — STAPLE TRI-STAPLE 2.0 CRV TIP

## (undated) DEVICE — PLEDGET SUT SOFT 3/8X3/16X1/16

## (undated) DEVICE — GLOVE #7.5 BIOGEL 30475

## (undated) DEVICE — COVER LIGHT HANDLE LB53

## (undated) DEVICE — TOWEL OR BLUE      MDT2168284

## (undated) DEVICE — GUIDEWIRE ANGLED GLIDE .035-150 GR3506

## (undated) DEVICE — SPONGE TONSIL LARGE

## (undated) DEVICE — SUT ETHILON 2-0 BLK PS-2

## (undated) DEVICE — SOLUTION SCRUB IODINE 4OZ

## (undated) DEVICE — DRAIN CHEST DRY SUCTION

## (undated) DEVICE — SEE MEDLINE ITEM 146313

## (undated) DEVICE — DRAPE BILATERAL LEG 84X80

## (undated) DEVICE — TAPE MEDIPORE 4IN X 2YDS

## (undated) DEVICE — GUIDEWIRE 0.35X260 AMPLATZ  46-526

## (undated) DEVICE — SYR ONLY LUER LOCK 20CC

## (undated) DEVICE — PAD BOVIE ADULT

## (undated) DEVICE — DRAPE STERI INSTRUMENT 1018

## (undated) DEVICE — SUTURE PROLENE 6-0 C-1 30 8706H

## (undated) DEVICE — DRAPE IOBAN 23X17 6650EZ

## (undated) DEVICE — CONTAINER SPECIMEN STRL 4OZ

## (undated) DEVICE — CLOSURE SKIN STERI STRIP 1/2X4

## (undated) DEVICE — DRAPE WARMER ORS-100

## (undated) DEVICE — KIT ANTIFOG

## (undated) DEVICE — SNARE

## (undated) DEVICE — GUIDEWIRE MEIER 0.035 X 185 SCH-30-600

## (undated) DEVICE — SPNG, GZ,PEANUT STRL

## (undated) DEVICE — SEE MEDLINE ITEM 146416

## (undated) DEVICE — DRESSING POST OP MEPILEX AG 4X6  498300

## (undated) DEVICE — CLIP APPLIER MCM20 STERILMED   ETHMCM20

## (undated) DEVICE — TRAY MINOR GEN SURG

## (undated) DEVICE — DRAPE THYROID WITH ARMBOARD

## (undated) DEVICE — APPLIER CLIP ENDO MED/LG 10MM

## (undated) DEVICE — SPONGE IV DRAIN 4X4 STERILE

## (undated) DEVICE — GUIDEWIRE 3X260CMX0.035IN GR3509

## (undated) DEVICE — TUBING HIGH PRESSURE 72 91051723

## (undated) DEVICE — SEE MEDLINE ITEM 154981

## (undated) DEVICE — PREP CHLORA 26ML

## (undated) DEVICE — LOOP MAXI RED 30-722

## (undated) DEVICE — SYRINGE 20ML 302830

## (undated) DEVICE — STAPLER ENDO GIA 60MM MED THCK

## (undated) DEVICE — SHEATH

## (undated) DEVICE — SEE MEDLINE ITEM 157116

## (undated) DEVICE — TAPE SILK 3IN

## (undated) DEVICE — ADAPTER SWIVEL

## (undated) DEVICE — RELOAD ENDO GIA TRISTAPLE 45MM

## (undated) DEVICE — DRAPE SPLIT CV 66350

## (undated) DEVICE — SYR 60CC W/GAUGE

## (undated) DEVICE — SEE MEDLINE ITEM 152622

## (undated) DEVICE — HEMOSTAT SURGICEL 4X8IN

## (undated) DEVICE — SEE MEDLINE ITEM 152487

## (undated) DEVICE — GOWN SMART LRG 044673

## (undated) DEVICE — ELECTRODE REM PLYHSV RETURN 9

## (undated) DEVICE — SPONGE LAP 18X18

## (undated) DEVICE — TRAY VASCULAR SLIDELL MEMORIAL

## (undated) DEVICE — DRESSING ADH ISLAND 3.6 X 14

## (undated) DEVICE — SEE MEDLINE ITEM 146417

## (undated) DEVICE — SUTURE MONOCRYL 0 CT-1 36 Y946H

## (undated) DEVICE — CATHETER BERENSTEIN SOFT-VU 5FR

## (undated) DEVICE — BLADE ELECTRO EDGE INSULATED

## (undated) DEVICE — GAUZE SPONGE 4X4 12PLY

## (undated) DEVICE — INTRODUCER SUPER SHEATH 6FR 16035-06B

## (undated) DEVICE — INTRODUCER SUPER SHEATH 5FR 16035-05B

## (undated) DEVICE — DRESSING ABSRBNT ISLAND 3.6X8

## (undated) DEVICE — ADHESIVE MASTISOL VIAL 48/BX

## (undated) DEVICE — DRESSING TELFA STRL 4X3 LF

## (undated) DEVICE — BAG DECANTER 10-102

## (undated) DEVICE — CATHETER SIZING PIGTAIL 5FR 13709801